# Patient Record
Sex: MALE | Race: BLACK OR AFRICAN AMERICAN | Employment: OTHER | URBAN - METROPOLITAN AREA
[De-identification: names, ages, dates, MRNs, and addresses within clinical notes are randomized per-mention and may not be internally consistent; named-entity substitution may affect disease eponyms.]

---

## 2017-08-10 ENCOUNTER — HOSPITAL ENCOUNTER (OUTPATIENT)
Age: 72
Discharge: HOME | End: 2017-08-10
Payer: MEDICARE

## 2017-08-10 ENCOUNTER — HOSPITAL ENCOUNTER (OUTPATIENT)
Dept: ULTRASOUND IMAGING | Age: 72
Discharge: HOME | End: 2017-08-10
Payer: MEDICARE

## 2017-08-10 DIAGNOSIS — N28.1 CYST OF KIDNEY, ACQUIRED: ICD-10-CM

## 2017-08-10 PROCEDURE — 76770 US EXAM ABDO BACK WALL COMP: CPT

## 2017-08-10 PROCEDURE — 84153 ASSAY OF PSA TOTAL: CPT

## 2017-08-10 PROCEDURE — 36415 COLL VENOUS BLD VENIPUNCTURE: CPT

## 2017-08-11 LAB — PROSTATE SPECIFIC ANTIGEN: 1.13 UG/L

## 2023-01-24 ENCOUNTER — HOSPITAL ENCOUNTER (OUTPATIENT)
Dept: PREADMISSION TESTING | Age: 78
Discharge: HOME | End: 2023-01-28
Payer: MEDICARE

## 2023-01-24 VITALS
HEART RATE: 85 BPM | WEIGHT: 251.32 LBS | DIASTOLIC BLOOD PRESSURE: 70 MMHG | OXYGEN SATURATION: 97 % | SYSTOLIC BLOOD PRESSURE: 148 MMHG | RESPIRATION RATE: 16 BRPM | TEMPERATURE: 98.2 F | BODY MASS INDEX: 33.31 KG/M2 | HEIGHT: 73 IN

## 2023-01-24 LAB
ABSOLUTE EOS #: 0.09 K/UL (ref 0–0.44)
ABSOLUTE IMMATURE GRANULOCYTE: 0.01 K/UL (ref 0–0.3)
ABSOLUTE LYMPH #: 2.03 K/UL (ref 1.1–3.7)
ABSOLUTE MONO #: 0.65 K/UL (ref 0.1–1.2)
ANION GAP SERPL CALCULATED.3IONS-SCNC: 11 MMOL/L (ref 9–17)
BASOPHILS # BLD: 1 % (ref 0–2)
BASOPHILS ABSOLUTE: 0.05 K/UL (ref 0–0.2)
BILIRUBIN URINE: ABNORMAL
BUN BLDV-MCNC: 11 MG/DL (ref 8–23)
BUN/CREAT BLD: 11 (ref 9–20)
CALCIUM SERPL-MCNC: 9.5 MG/DL (ref 8.6–10.4)
CHLORIDE BLD-SCNC: 106 MMOL/L (ref 98–107)
CO2: 21 MMOL/L (ref 20–31)
COLOR: ABNORMAL
CREAT SERPL-MCNC: 0.96 MG/DL (ref 0.7–1.2)
EOSINOPHILS RELATIVE PERCENT: 1 % (ref 1–4)
EPITHELIAL CELLS UA: ABNORMAL /HPF (ref 0–5)
GFR SERPL CREATININE-BSD FRML MDRD: >60 ML/MIN/1.73M2
GLUCOSE BLD-MCNC: 129 MG/DL (ref 70–99)
GLUCOSE URINE: ABNORMAL
HCT VFR BLD CALC: 48.7 % (ref 40.7–50.3)
HEMOGLOBIN: 15.8 G/DL (ref 13–17)
IMMATURE GRANULOCYTES: 0 %
KETONES, URINE: NEGATIVE
LEUKOCYTE ESTERASE, URINE: NEGATIVE
LYMPHOCYTES # BLD: 32 % (ref 24–43)
MCH RBC QN AUTO: 30.6 PG (ref 25.2–33.5)
MCHC RBC AUTO-ENTMCNC: 32.4 G/DL (ref 28.4–34.8)
MCV RBC AUTO: 94.2 FL (ref 82.6–102.9)
MONOCYTES # BLD: 10 % (ref 3–12)
MUCUS: ABNORMAL
NITRITE, URINE: NEGATIVE
NRBC AUTOMATED: 0 PER 100 WBC
PDW BLD-RTO: 13.1 % (ref 11.8–14.4)
PH UA: 5.5 (ref 5–8)
PLATELET # BLD: 218 K/UL (ref 138–453)
PMV BLD AUTO: 10.8 FL (ref 8.1–13.5)
POTASSIUM SERPL-SCNC: 4.6 MMOL/L (ref 3.7–5.3)
PROTEIN UA: ABNORMAL
RBC # BLD: 5.17 M/UL (ref 4.21–5.77)
RBC UA: ABNORMAL /HPF (ref 0–2)
REASON FOR REJECTION: NORMAL
SEG NEUTROPHILS: 56 % (ref 36–65)
SEGMENTED NEUTROPHILS ABSOLUTE COUNT: 3.56 K/UL (ref 1.5–8.1)
SODIUM BLD-SCNC: 138 MMOL/L (ref 135–144)
SPECIFIC GRAVITY UA: 1.05 (ref 1–1.03)
TURBIDITY: ABNORMAL
URINE HGB: NEGATIVE
UROBILINOGEN, URINE: NORMAL
WBC # BLD: 6.4 K/UL (ref 3.5–11.3)
WBC UA: ABNORMAL /HPF (ref 0–5)
ZZ NTE CLEAN UP: ORDERED TEST: NORMAL
ZZ NTE WITH NAME CLEAN UP: SPECIMEN SOURCE: NORMAL

## 2023-01-24 PROCEDURE — 81001 URINALYSIS AUTO W/SCOPE: CPT

## 2023-01-24 PROCEDURE — 93005 ELECTROCARDIOGRAM TRACING: CPT | Performed by: ORTHOPAEDIC SURGERY

## 2023-01-24 PROCEDURE — 80048 BASIC METABOLIC PNL TOTAL CA: CPT

## 2023-01-24 PROCEDURE — 36415 COLL VENOUS BLD VENIPUNCTURE: CPT

## 2023-01-24 PROCEDURE — 87086 URINE CULTURE/COLONY COUNT: CPT

## 2023-01-24 PROCEDURE — 85025 COMPLETE CBC W/AUTO DIFF WBC: CPT

## 2023-01-24 RX ORDER — LISINOPRIL 20 MG/1
TABLET ORAL
COMMUNITY
Start: 2022-11-30

## 2023-01-24 ASSESSMENT — PAIN DESCRIPTION - LOCATION: LOCATION: BACK

## 2023-01-24 ASSESSMENT — PAIN DESCRIPTION - FREQUENCY: FREQUENCY: CONTINUOUS

## 2023-01-24 ASSESSMENT — PAIN DESCRIPTION - ONSET: ONSET: ON-GOING

## 2023-01-24 ASSESSMENT — PAIN SCALES - GENERAL: PAINLEVEL_OUTOF10: 7

## 2023-01-24 ASSESSMENT — PAIN DESCRIPTION - DESCRIPTORS: DESCRIPTORS: THROBBING

## 2023-01-24 NOTE — H&P
History and Physical Service   [facility]    HISTORY AND PHYSICAL EXAMINATION            Date of Evaluation: 1/24/2023  Patient name:  Morgan Barker  MRN:   [id number]  YOB: 1945  PCP:    Marni Mcdonald MD    History Obtained From:     Patient, medical records    History of Present Illness: This is Morgan Barker a 65 y.o. male who presents for a pre-admission testing appointment for an upcoming L 3-5 [address] by Nyla Rivera MD scheduled on 02/15/2023 at 0730 due to Spinal stenosis of lumbar region, unspecified whether neurogenic claudication present [M48.061]. The patient's chief complaint is 5-6/10 lower back pain aching pain that radiates to bilateral lower extremities which has progressively worsened over the past year. Back pain is aggravated by standing after prolonged sitting or laying and is minimally relieved with rest, stretching. Prior treatment includes nerve ablation, steroid injections with no relief. Denies recent falls and injuries. Sleep apnea questionnaire  1) Do you snore loudly? Yes  2) Do you often feel tired, fatigued, or sleepy? No  3) Has anyone observed you stop breathing or choking/gasping during your sleep? No  4) Do you have hypertension? Yes  5) BMI >35 kg/m2? No  6) Age > 50? Yes  7) Pt is a male? Yes    Functional Capacity:  1) Pt is able to walk 2 city blocks on level ground, but not without back pain. 2) Pt is able to climb 2 flights of stairs, but not without back pain. 3) Pt is not able to walk up a hill for 1-2 city blocks without back pain. Uses quad cane for stability.      Past Medical History:     Past Medical History:   Diagnosis Date    Arthritis     BPH (benign prostatic hyperplasia)     Cancer (HCC)     prostate    Cystic kidney disease     DDD (degenerative disc disease), lumbar     Hypertension     Renal mass     Spondylolisthesis of lumbar region         Past Surgical History:     Past Surgical History:   Procedure Laterality Date    COLONOSCOPY      CYSTOSCOPY  2012    TURP  2006        Medications Prior to Admission:     Prior to Admission medications    Medication Sig Start Date End Date Taking? Authorizing Provider   lisinopril (PRINIVIL;ZESTRIL) 20 MG tablet take 1 tablet by mouth once daily 11/30/22   Historical Provider, MD        Allergies:     Patient has no known allergies. Social History:     Tobacco:    reports that he has been smoking cigarettes. He started smoking about 63 years ago. He has been smoking an average of .5 packs per day. He has never used smokeless tobacco.  Alcohol:      reports current alcohol use. Drug Use:  has no history on file for drug use. Family History:     History reviewed. No pertinent family history. Review of Systems:     Positive and Negative as described in HPI. CONSTITUTIONAL: Negative for fevers, chills, sweats, fatigue, and weight loss. HEENT: Glasses. Full dentures. Negative for hearing changes, rhinorrhea, and throat pain. RESPIRATORY: Negative for shortness of breath, cough, congestion, and wheezing. CARDIOVASCULAR: Negative for chest pain, blood clot, irregular heartbeat, and palpitations. GASTROINTESTINAL: Hemorrhoids. Negative for reflux, nausea, vomiting, diarrhea, constipation, change in bowel habits, and abdominal pain. GENITOURINARY: Hx TURP for BPH- no current urinary issues. Follows with urologist yearly. Negative for difficulty of urination, burning with urination, and frequency. INTEGUMENT: Negative for rash, skin lesions, and easy bruising. Instructed pt to call Dr. Sim Blanchard as soon as possible if a rash or wound develops prior to surgery. Pt voiced understanding. HEMATOLOGIC/LYMPHATIC: Negative for swelling/edema. ALLERGIC/IMMUNOLOGIC: Negative for urticaria and itching. ENDOCRINE: Negative for increase in thirst, increase in urination, and heat or cold intolerance. MUSCULOSKELETAL: See HPI.    NEUROLOGICAL: Neuropathy hands and feet. Negative for headaches, dizziness, lightheadedness  BEHAVIOR/PSYCH: Negative for depression and anxiety. Physical Exam:   BP (!) 148/70   Pulse 85   Temp 98.2 °F (36.8 °C) (Oral)   Resp 16   Ht 6' 1\" (1.854 m)   Wt 251 lb 5.2 oz (114 kg)   SpO2 97%   BMI 33.16 kg/m²   No LMP for male patient. No obstetric history on file. No results for input(s): POCGLU in the last 72 hours. General Appearance:  Alert, well appearing, and in no acute distress. Mental status:  Oriented to person, place, and time. Head:  Normocephalic and atraumatic. Eye: Glasses. No icterus, redness, pupils equal and reactive, extraocular eye movements intact, and conjunctiva clear. Ear:  Hearing grossly intact. Nose:  No drainage noted. Mouth: Full dentures. Mucous membranes moist.  Neck:  Supple and no carotid bruits noted. Lungs:  Bilateral equal air entry, clear to auscultation, no wheezing, rales or rhonchi, and normal effort. Cardiovascular:  Normal rate, regular rhythm, no murmur, gallop, or rub. Abdomen: Obese. Soft, non-tender, non-distended, and active bowel sounds. Neurologic: Antalgic gait with quad cane. Normal speech and cranial nerves II through XII grossly intact. Strength 5/5 bilaterally. Skin: Dry skin all extremities. No gross lesions, rashes, bruising, or bleeding on exposed skin area. Extremities: Chronic calf tenderness due to radiating back pain. Posterior tibial pulses 2+ bilaterally. No pedal edema. Psych:  Normal affect.      Investigations:      Laboratory Testing:  Recent Results (from the past 24 hour(s))   EKG 12 Lead    Collection Time: 01/24/23 11:26 AM   Result Value Ref Range    Ventricular Rate 69 BPM    Atrial Rate 69 BPM    P-R Interval 146 ms    QRS Duration 90 ms    Q-T Interval 354 ms    QTc Calculation (Bazett) 379 ms    P Axis 58 degrees    R Axis -7 degrees    T Axis 45 degrees   Basic Metabolic Panel    Collection Time: 01/24/23 11:31 AM   Result Value Ref Range    Glucose 129 (H) 70 - 99 mg/dL    BUN 11 8 - 23 mg/dL    Creatinine 0.96 0.70 - 1.20 mg/dL    Est, Glom Filt Rate >60 >60 mL/min/1.73m2    Bun/Cre Ratio 11 9 - 20    Calcium 9.5 8.6 - 10.4 mg/dL    Sodium 138 135 - 144 mmol/L    Potassium 4.6 3.7 - 5.3 mmol/L    Chloride 106 98 - 107 mmol/L    CO2 21 20 - 31 mmol/L    Anion Gap 11 9 - 17 mmol/L   CBC with Auto Differential    Collection Time: 23 11:31 AM   Result Value Ref Range    WBC 6.4 3.5 - 11.3 k/uL    RBC 5.17 4.21 - 5.77 m/uL    Hemoglobin 15.8 13.0 - 17.0 g/dL    Hematocrit 48.7 40.7 - 50.3 %    MCV 94.2 82.6 - 102.9 fL    MCH 30.6 25.2 - 33.5 pg    MCHC 32.4 28.4 - 34.8 g/dL    RDW 13.1 11.8 - 14.4 %    Platelets 461 432 - 429 k/uL    MPV 10.8 8.1 - 13.5 fL    NRBC Automated 0.0 0.0 per 100 WBC    Seg Neutrophils 56 36 - 65 %    Lymphocytes 32 24 - 43 %    Monocytes 10 3 - 12 %    Eosinophils % 1 1 - 4 %    Basophils 1 0 - 2 %    Immature Granulocytes 0 0 %    Segs Absolute 3.56 1.50 - 8.10 k/uL    Absolute Lymph # 2.03 1.10 - 3.70 k/uL    Absolute Mono # 0.65 0.10 - 1.20 k/uL    Absolute Eos # 0.09 0.00 - 0.44 k/uL    Basophils Absolute 0.05 0.00 - 0.20 k/uL    Absolute Immature Granulocyte 0.01 0.00 - 0.30 k/uL   SPECIMEN REJECTION    Collection Time: 23 11:31 AM   Result Value Ref Range    Specimen Source . BLOOD     Ordered Test PT,PTT     Reason for Rejection Unable to perform testing: Specimen hemolyzed. Recent Labs     23  1131   HGB 15.8   HCT 48.7   WBC 6.4   MCV 94.2      K 4.6      CO2 21   BUN 11   CREATININE 0.96   GLUCOSE 129*       No results for input(s): COVID19 in the last 720 hours. *Please note that labs listed above are the most recent lab values available in EPIC at the time of the visit and additional labs may have been drawn or resulted since that time. Imaging/Diagnostics:      No results found. EK2023. See Epic. Diagnosis:      1.  Spinal stenosis of lumbar region, unspecified whether neurogenic claudication present [M48.061]. Plans:     1.  L 3-5 LUMBAR LAMINECTOMY POSTERIOR  - SAMEERA Cotto - CNP  1/24/2023  12:05 PM

## 2023-01-24 NOTE — PRE-PROCEDURE INSTRUCTIONS
[facility] at 06:00 AM    Once you enter the hospital lobby, take the elevators to the second floor. Check-In is at the surgery registration desk. Continue to take your home medications as you normally do up to and including the night before surgery with the exception of any blood thinning medications. Please stop any blood thinning medications as directed by your surgeon or prescribing physician. Failure to stop certain medications may interfere with your scheduled surgery. These may include:  Aspirin, Warfarin (Coumadin), Clopidogrel (Plavix), Ibuprofen (Motrin, Advil), Naproxen (Aleve), Meloxicam (Mobic), Celecoxib (Celebrex), Eliquis, Pradaxa, Xarelto, Effient, Fish Oil, Herbal supplements. Please take the following medication(s) the day of surgery with a small sip of water:  none      PREPARING FOR YOUR SURGERY:     Before surgery, you can play an important role in your own health. Because skin is not sterile, we need to be sure that your skin is as free of germs as possible before surgery by carefully washing before surgery. Preparing or prepping skin before surgery can reduce the risk of a surgical site infection.   Do not shave the area of your body where your surgery will be performed unless you received specific permission from your physician. You will need to shower at home the night before surgery and the morning of surgery with a special soap called chlorhexidine gluconate (CHG*). *Not to be used by people allergic to Chlorhexidine Gluconate (CHG). Following these instructions will help you be sure that your skin is clean before surgery. Instructions on cleaning your skin before surgery: The night before your surgery: You will need to shower with warm water (not hot) and the CHG soap. Use a clean wash cloth and a clean towel. Have clean clothes available to put on after the shower.       First wash your hair with regular shampoo. Rinse your hair and body thoroughly to remove the shampoo. Wash your face and genital area (private parts) with your regular soap or water only. Thoroughly rinse your body with warm water from the neck down. Turn water off to prevent rinsing the soap off too soon. With a clean wet washcloth and half of the CHG soap in the bottle, lather your entire body from the neck down. Do not use CHG soap near your eyes or ears to avoid injury to those areas. Wash thoroughly, paying special attention to the area where your surgery will be performed. Wash your body gently for five (5) minutes. Avoid scrubbing your skin too hard. Turn the water back on and rinse your body thoroughly. Pat yourself dry with a clean, soft towel. Do not apply lotion, cream or powder. Dress with clean freshly washed clothes. The morning of surgery:    Repeat shower following steps above - using remaining half of CHG soap in bottle. Patient Instructions: If you are having any type of anesthesia you are to have nothing to eat or drink after midnight the night before your surgery. This includes gum, hard candy, mints, water or smoking or chewing tobacco.  The only exception to this is a small sip of water to take with any morning dose of heart, blood pressure, or seizure medications. No alcoholic beverages for 24 hours prior to surgery. Brush your teeth but do not swallow water. Bring your eyeglasses and case with you. No contacts are to be worn the day of surgery. You also may bring your hearing aids. Most surgical procedures involving anesthesia will require that you remove your dentures prior to surgery. Do not wear any jewelry or body piercings day of surgery. Also, NO lotion, perfume or deodorant to be used the day of surgery.   No nail polish on the operative extremity (arm/leg surgeries)    If you are staying overnight with us, please bring a small bag of necessary personal items. Please wear loose, comfortable clothing. If you are potentially going to have a cast or brace bring clothing that will fit over them. In case of illness - If you have cold or flu like symptoms (high fever, runny nose, sore throat, cough, etc.) rash, nausea, vomiting, loose stools, and/or recent contact with someone who has a contagious disease (chicken pox, measles, etc.) Please call your doctor before coming to the hospital.         Day of Surgery/Procedure:    As a patient at [facility] you can expect quality medical and nursing care that is centered on your individual needs. Our goal is to make your surgical experience as comfortable as possible    . Transportation After Your Surgery/Procedure: You will need a friend or family member to drive you home after your procedure. Your  must be 25years of age or older and able to sign off on your discharge instructions. A taxi cab or any other form of public transportation is not acceptable. Your friend or family member must stay at the hospital throughout your procedure. Someone must remain with you for the first 24 hours after your surgery if you receive anesthesia or medication. If you do not have someone to stay with you, your procedure may be cancelled.       If you have any other questions regarding your procedure or the day of surgery, please call [phone]      _________________________  ____________________________  Signature (Patient)              Signature (Provider) & date

## 2023-01-25 LAB
CULTURE: NORMAL
EKG ATRIAL RATE: 69 BPM
EKG P AXIS: 58 DEGREES
EKG P-R INTERVAL: 146 MS
EKG Q-T INTERVAL: 354 MS
EKG QRS DURATION: 90 MS
EKG QTC CALCULATION (BAZETT): 379 MS
EKG R AXIS: -7 DEGREES
EKG T AXIS: 45 DEGREES
EKG VENTRICULAR RATE: 69 BPM
SPECIMEN DESCRIPTION: NORMAL

## 2023-02-15 ENCOUNTER — APPOINTMENT (OUTPATIENT)
Dept: GENERAL RADIOLOGY | Age: 78
End: 2023-02-15
Attending: ORTHOPAEDIC SURGERY
Payer: MEDICARE

## 2023-02-15 ENCOUNTER — ANESTHESIA (OUTPATIENT)
Dept: OPERATING ROOM | Age: 78
End: 2023-02-15
Payer: MEDICARE

## 2023-02-15 ENCOUNTER — ANESTHESIA EVENT (OUTPATIENT)
Dept: OPERATING ROOM | Age: 78
End: 2023-02-15
Payer: MEDICARE

## 2023-02-15 ENCOUNTER — HOSPITAL ENCOUNTER (OUTPATIENT)
Age: 78
Discharge: HOME | End: 2023-02-16
Attending: ORTHOPAEDIC SURGERY | Admitting: ORTHOPAEDIC SURGERY
Payer: MEDICARE

## 2023-02-15 DIAGNOSIS — M48.062 LUMBAR STENOSIS WITH NEUROGENIC CLAUDICATION: Primary | Chronic | ICD-10-CM

## 2023-02-15 PROBLEM — R73.9 HYPERGLYCEMIA: Status: ACTIVE | Noted: 2023-02-15

## 2023-02-15 PROBLEM — Z72.0 TOBACCO ABUSE: Status: ACTIVE | Noted: 2023-02-15

## 2023-02-15 PROBLEM — I10 PRIMARY HYPERTENSION: Status: ACTIVE | Noted: 2023-02-15

## 2023-02-15 PROBLEM — E66.09 CLASS 1 OBESITY DUE TO EXCESS CALORIES WITH SERIOUS COMORBIDITY IN ADULT: Status: ACTIVE | Noted: 2023-02-15

## 2023-02-15 LAB
ABSOLUTE EOS #: <0.03 K/UL (ref 0–0.44)
ABSOLUTE IMMATURE GRANULOCYTE: 0.03 K/UL (ref 0–0.3)
ABSOLUTE LYMPH #: 0.83 K/UL (ref 1.1–3.7)
ABSOLUTE MONO #: 0.38 K/UL (ref 0.1–1.2)
ANION GAP SERPL CALCULATED.3IONS-SCNC: 11 MMOL/L (ref 9–17)
BASOPHILS # BLD: 0 % (ref 0–2)
BASOPHILS ABSOLUTE: <0.03 K/UL (ref 0–0.2)
BUN SERPL-MCNC: 13 MG/DL (ref 8–23)
BUN/CREAT BLD: 11 (ref 9–20)
CALCIUM SERPL-MCNC: 9.1 MG/DL (ref 8.6–10.4)
CHLORIDE SERPL-SCNC: 99 MMOL/L (ref 98–107)
CO2 SERPL-SCNC: 23 MMOL/L (ref 20–31)
CREAT SERPL-MCNC: 1.2 MG/DL (ref 0.7–1.2)
EOSINOPHILS RELATIVE PERCENT: 0 % (ref 1–4)
GFR SERPL CREATININE-BSD FRML MDRD: >60 ML/MIN/1.73M2
GLUCOSE BLD-MCNC: 149 MG/DL (ref 75–110)
GLUCOSE SERPL-MCNC: 196 MG/DL (ref 70–99)
HCT VFR BLD AUTO: 42.3 % (ref 40.7–50.3)
HGB BLD-MCNC: 14 G/DL (ref 13–17)
IMMATURE GRANULOCYTES: 0 %
INR PPP: 0.9
LYMPHOCYTES # BLD: 8 % (ref 24–43)
MCH RBC QN AUTO: 30.8 PG (ref 25.2–33.5)
MCHC RBC AUTO-ENTMCNC: 33.1 G/DL (ref 28.4–34.8)
MCV RBC AUTO: 93.2 FL (ref 82.6–102.9)
MONOCYTES # BLD: 4 % (ref 3–12)
NRBC AUTOMATED: 0 PER 100 WBC
PARTIAL THROMBOPLASTIN TIME: 28.3 SEC (ref 23.9–33.8)
PDW BLD-RTO: 13.1 % (ref 11.8–14.4)
PLATELET # BLD AUTO: 212 K/UL (ref 138–453)
PMV BLD AUTO: 10.1 FL (ref 8.1–13.5)
POTASSIUM SERPL-SCNC: 4.7 MMOL/L (ref 3.7–5.3)
PROTHROMBIN TIME: 12.5 SEC (ref 11.5–14.2)
RBC # BLD: 4.54 M/UL (ref 4.21–5.77)
SEG NEUTROPHILS: 88 % (ref 36–65)
SEGMENTED NEUTROPHILS ABSOLUTE COUNT: 9.1 K/UL (ref 1.5–8.1)
SODIUM SERPL-SCNC: 133 MMOL/L (ref 135–144)
WBC # BLD AUTO: 10.4 K/UL (ref 3.5–11.3)

## 2023-02-15 PROCEDURE — 97116 GAIT TRAINING THERAPY: CPT

## 2023-02-15 PROCEDURE — 97162 PT EVAL MOD COMPLEX 30 MIN: CPT

## 2023-02-15 PROCEDURE — 83036 HEMOGLOBIN GLYCOSYLATED A1C: CPT

## 2023-02-15 PROCEDURE — 2720000010 HC SURG SUPPLY STERILE: Performed by: ORTHOPAEDIC SURGERY

## 2023-02-15 PROCEDURE — 3209999900 FLUORO FOR SURGICAL PROCEDURES

## 2023-02-15 PROCEDURE — 2580000003 HC RX 258: Performed by: ORTHOPAEDIC SURGERY

## 2023-02-15 PROCEDURE — 7100000000 HC PACU RECOVERY - FIRST 15 MIN: Performed by: ORTHOPAEDIC SURGERY

## 2023-02-15 PROCEDURE — 3700000001 HC ADD 15 MINUTES (ANESTHESIA): Performed by: ORTHOPAEDIC SURGERY

## 2023-02-15 PROCEDURE — 6370000000 HC RX 637 (ALT 250 FOR IP): Performed by: ORTHOPAEDIC SURGERY

## 2023-02-15 PROCEDURE — 2500000003 HC RX 250 WO HCPCS: Performed by: NURSE ANESTHETIST, CERTIFIED REGISTERED

## 2023-02-15 PROCEDURE — 85730 THROMBOPLASTIN TIME PARTIAL: CPT

## 2023-02-15 PROCEDURE — 6360000002 HC RX W HCPCS: Performed by: ORTHOPAEDIC SURGERY

## 2023-02-15 PROCEDURE — 85610 PROTHROMBIN TIME: CPT

## 2023-02-15 PROCEDURE — 2709999900 HC NON-CHARGEABLE SUPPLY: Performed by: ORTHOPAEDIC SURGERY

## 2023-02-15 PROCEDURE — 2580000003 HC RX 258: Performed by: ANESTHESIOLOGY

## 2023-02-15 PROCEDURE — 36415 COLL VENOUS BLD VENIPUNCTURE: CPT

## 2023-02-15 PROCEDURE — 2500000003 HC RX 250 WO HCPCS: Performed by: ORTHOPAEDIC SURGERY

## 2023-02-15 PROCEDURE — 85025 COMPLETE CBC W/AUTO DIFF WBC: CPT

## 2023-02-15 PROCEDURE — 6360000002 HC RX W HCPCS: Performed by: NURSE ANESTHETIST, CERTIFIED REGISTERED

## 2023-02-15 PROCEDURE — 97530 THERAPEUTIC ACTIVITIES: CPT

## 2023-02-15 PROCEDURE — 82947 ASSAY GLUCOSE BLOOD QUANT: CPT

## 2023-02-15 PROCEDURE — 3700000000 HC ANESTHESIA ATTENDED CARE: Performed by: ORTHOPAEDIC SURGERY

## 2023-02-15 PROCEDURE — A4217 STERILE WATER/SALINE, 500 ML: HCPCS | Performed by: ORTHOPAEDIC SURGERY

## 2023-02-15 PROCEDURE — 80048 BASIC METABOLIC PNL TOTAL CA: CPT

## 2023-02-15 PROCEDURE — 2580000003 HC RX 258: Performed by: NURSE ANESTHETIST, CERTIFIED REGISTERED

## 2023-02-15 PROCEDURE — 7100000001 HC PACU RECOVERY - ADDTL 15 MIN: Performed by: ORTHOPAEDIC SURGERY

## 2023-02-15 PROCEDURE — 3600000002 HC SURGERY LEVEL 2 BASE: Performed by: ORTHOPAEDIC SURGERY

## 2023-02-15 PROCEDURE — 99221 1ST HOSP IP/OBS SF/LOW 40: CPT | Performed by: STUDENT IN AN ORGANIZED HEALTH CARE EDUCATION/TRAINING PROGRAM

## 2023-02-15 PROCEDURE — 3600000012 HC SURGERY LEVEL 2 ADDTL 15MIN: Performed by: ORTHOPAEDIC SURGERY

## 2023-02-15 RX ORDER — FENTANYL CITRATE 50 UG/ML
25 INJECTION, SOLUTION INTRAMUSCULAR; INTRAVENOUS EVERY 5 MIN PRN
Status: DISCONTINUED | OUTPATIENT
Start: 2023-02-15 | End: 2023-02-15 | Stop reason: HOSPADM

## 2023-02-15 RX ORDER — SODIUM CHLORIDE 9 MG/ML
INJECTION, SOLUTION INTRAVENOUS PRN
Status: DISCONTINUED | OUTPATIENT
Start: 2023-02-15 | End: 2023-02-15 | Stop reason: HOSPADM

## 2023-02-15 RX ORDER — SENNA AND DOCUSATE SODIUM 50; 8.6 MG/1; MG/1
1 TABLET, FILM COATED ORAL 2 TIMES DAILY
Status: DISCONTINUED | OUTPATIENT
Start: 2023-02-15 | End: 2023-02-16 | Stop reason: HOSPADM

## 2023-02-15 RX ORDER — HYDROMORPHONE HYDROCHLORIDE 1 MG/ML
0.5 INJECTION, SOLUTION INTRAMUSCULAR; INTRAVENOUS; SUBCUTANEOUS EVERY 5 MIN PRN
Status: DISCONTINUED | OUTPATIENT
Start: 2023-02-15 | End: 2023-02-15 | Stop reason: HOSPADM

## 2023-02-15 RX ORDER — SODIUM CHLORIDE 9 MG/ML
INJECTION, SOLUTION INTRAVENOUS PRN
Status: DISCONTINUED | OUTPATIENT
Start: 2023-02-15 | End: 2023-02-16 | Stop reason: HOSPADM

## 2023-02-15 RX ORDER — SODIUM CHLORIDE 0.9 % (FLUSH) 0.9 %
5-40 SYRINGE (ML) INJECTION PRN
Status: DISCONTINUED | OUTPATIENT
Start: 2023-02-15 | End: 2023-02-15 | Stop reason: HOSPADM

## 2023-02-15 RX ORDER — SODIUM CHLORIDE 0.9 % (FLUSH) 0.9 %
5-40 SYRINGE (ML) INJECTION EVERY 12 HOURS SCHEDULED
Status: DISCONTINUED | OUTPATIENT
Start: 2023-02-15 | End: 2023-02-16 | Stop reason: HOSPADM

## 2023-02-15 RX ORDER — HYDROCODONE BITARTRATE AND ACETAMINOPHEN 5; 325 MG/1; MG/1
2 TABLET ORAL EVERY 4 HOURS PRN
Status: DISCONTINUED | OUTPATIENT
Start: 2023-02-15 | End: 2023-02-16 | Stop reason: HOSPADM

## 2023-02-15 RX ORDER — MORPHINE SULFATE 2 MG/ML
2 INJECTION, SOLUTION INTRAMUSCULAR; INTRAVENOUS
Status: DISCONTINUED | OUTPATIENT
Start: 2023-02-15 | End: 2023-02-16 | Stop reason: HOSPADM

## 2023-02-15 RX ORDER — SODIUM CHLORIDE, SODIUM LACTATE, POTASSIUM CHLORIDE, CALCIUM CHLORIDE 600; 310; 30; 20 MG/100ML; MG/100ML; MG/100ML; MG/100ML
INJECTION, SOLUTION INTRAVENOUS CONTINUOUS PRN
Status: DISCONTINUED | OUTPATIENT
Start: 2023-02-15 | End: 2023-02-15 | Stop reason: SDUPTHER

## 2023-02-15 RX ORDER — BUPIVACAINE HYDROCHLORIDE AND EPINEPHRINE 5; 5 MG/ML; UG/ML
INJECTION, SOLUTION EPIDURAL; INTRACAUDAL; PERINEURAL PRN
Status: DISCONTINUED | OUTPATIENT
Start: 2023-02-15 | End: 2023-02-15 | Stop reason: ALTCHOICE

## 2023-02-15 RX ORDER — MORPHINE SULFATE 4 MG/ML
4 INJECTION, SOLUTION INTRAMUSCULAR; INTRAVENOUS
Status: DISCONTINUED | OUTPATIENT
Start: 2023-02-15 | End: 2023-02-16 | Stop reason: HOSPADM

## 2023-02-15 RX ORDER — DEXTROSE MONOHYDRATE 100 MG/ML
INJECTION, SOLUTION INTRAVENOUS CONTINUOUS PRN
Status: DISCONTINUED | OUTPATIENT
Start: 2023-02-15 | End: 2023-02-16 | Stop reason: HOSPADM

## 2023-02-15 RX ORDER — PHENYLEPHRINE HCL IN 0.9% NACL 1 MG/10 ML
SYRINGE (ML) INTRAVENOUS PRN
Status: DISCONTINUED | OUTPATIENT
Start: 2023-02-15 | End: 2023-02-15 | Stop reason: SDUPTHER

## 2023-02-15 RX ORDER — ROCURONIUM BROMIDE 10 MG/ML
INJECTION, SOLUTION INTRAVENOUS PRN
Status: DISCONTINUED | OUTPATIENT
Start: 2023-02-15 | End: 2023-02-15 | Stop reason: SDUPTHER

## 2023-02-15 RX ORDER — HYDROMORPHONE HCL 110MG/55ML
PATIENT CONTROLLED ANALGESIA SYRINGE INTRAVENOUS PRN
Status: DISCONTINUED | OUTPATIENT
Start: 2023-02-15 | End: 2023-02-15 | Stop reason: SDUPTHER

## 2023-02-15 RX ORDER — PROPOFOL 10 MG/ML
INJECTION, EMULSION INTRAVENOUS PRN
Status: DISCONTINUED | OUTPATIENT
Start: 2023-02-15 | End: 2023-02-15 | Stop reason: SDUPTHER

## 2023-02-15 RX ORDER — DEXAMETHASONE SODIUM PHOSPHATE 10 MG/ML
INJECTION, SOLUTION INTRAMUSCULAR; INTRAVENOUS PRN
Status: DISCONTINUED | OUTPATIENT
Start: 2023-02-15 | End: 2023-02-15 | Stop reason: SDUPTHER

## 2023-02-15 RX ORDER — INSULIN LISPRO 100 [IU]/ML
0-4 INJECTION, SOLUTION INTRAVENOUS; SUBCUTANEOUS NIGHTLY
Status: DISCONTINUED | OUTPATIENT
Start: 2023-02-15 | End: 2023-02-16 | Stop reason: HOSPADM

## 2023-02-15 RX ORDER — INSULIN LISPRO 100 [IU]/ML
0-4 INJECTION, SOLUTION INTRAVENOUS; SUBCUTANEOUS
Status: DISCONTINUED | OUTPATIENT
Start: 2023-02-15 | End: 2023-02-16 | Stop reason: HOSPADM

## 2023-02-15 RX ORDER — SODIUM CHLORIDE 9 MG/ML
INJECTION, SOLUTION INTRAVENOUS CONTINUOUS
Status: DISCONTINUED | OUTPATIENT
Start: 2023-02-15 | End: 2023-02-16 | Stop reason: HOSPADM

## 2023-02-15 RX ORDER — LISINOPRIL 20 MG/1
20 TABLET ORAL DAILY
Status: DISCONTINUED | OUTPATIENT
Start: 2023-02-15 | End: 2023-02-16 | Stop reason: HOSPADM

## 2023-02-15 RX ORDER — ONDANSETRON 2 MG/ML
4 INJECTION INTRAMUSCULAR; INTRAVENOUS EVERY 6 HOURS PRN
Status: DISCONTINUED | OUTPATIENT
Start: 2023-02-15 | End: 2023-02-16 | Stop reason: HOSPADM

## 2023-02-15 RX ORDER — SODIUM CHLORIDE 0.9 % (FLUSH) 0.9 %
5-40 SYRINGE (ML) INJECTION EVERY 12 HOURS SCHEDULED
Status: DISCONTINUED | OUTPATIENT
Start: 2023-02-15 | End: 2023-02-15 | Stop reason: HOSPADM

## 2023-02-15 RX ORDER — HYDROCODONE BITARTRATE AND ACETAMINOPHEN 5; 325 MG/1; MG/1
2 TABLET ORAL EVERY 4 HOURS PRN
Status: DISCONTINUED | OUTPATIENT
Start: 2023-02-15 | End: 2023-02-15

## 2023-02-15 RX ORDER — TIZANIDINE 4 MG/1
4 TABLET ORAL EVERY 8 HOURS PRN
Status: DISCONTINUED | OUTPATIENT
Start: 2023-02-15 | End: 2023-02-16 | Stop reason: HOSPADM

## 2023-02-15 RX ORDER — SODIUM CHLORIDE 9 MG/ML
INJECTION, SOLUTION INTRAVENOUS CONTINUOUS
Status: DISCONTINUED | OUTPATIENT
Start: 2023-02-15 | End: 2023-02-15

## 2023-02-15 RX ORDER — ONDANSETRON 2 MG/ML
INJECTION INTRAMUSCULAR; INTRAVENOUS PRN
Status: DISCONTINUED | OUTPATIENT
Start: 2023-02-15 | End: 2023-02-15 | Stop reason: SDUPTHER

## 2023-02-15 RX ORDER — PROMETHAZINE HYDROCHLORIDE 12.5 MG/1
12.5 TABLET ORAL EVERY 6 HOURS PRN
Status: DISCONTINUED | OUTPATIENT
Start: 2023-02-15 | End: 2023-02-16 | Stop reason: HOSPADM

## 2023-02-15 RX ORDER — SODIUM CHLORIDE, SODIUM LACTATE, POTASSIUM CHLORIDE, CALCIUM CHLORIDE 600; 310; 30; 20 MG/100ML; MG/100ML; MG/100ML; MG/100ML
INJECTION, SOLUTION INTRAVENOUS CONTINUOUS
Status: DISCONTINUED | OUTPATIENT
Start: 2023-02-15 | End: 2023-02-15

## 2023-02-15 RX ORDER — ONDANSETRON 2 MG/ML
4 INJECTION INTRAMUSCULAR; INTRAVENOUS
Status: DISCONTINUED | OUTPATIENT
Start: 2023-02-15 | End: 2023-02-15 | Stop reason: HOSPADM

## 2023-02-15 RX ORDER — POLYETHYLENE GLYCOL 3350 17 G/17G
17 POWDER, FOR SOLUTION ORAL DAILY
Status: DISCONTINUED | OUTPATIENT
Start: 2023-02-15 | End: 2023-02-16 | Stop reason: HOSPADM

## 2023-02-15 RX ORDER — HYDROCODONE BITARTRATE AND ACETAMINOPHEN 5; 325 MG/1; MG/1
1 TABLET ORAL EVERY 4 HOURS PRN
Status: DISCONTINUED | OUTPATIENT
Start: 2023-02-15 | End: 2023-02-16 | Stop reason: HOSPADM

## 2023-02-15 RX ORDER — HYDROCODONE BITARTRATE AND ACETAMINOPHEN 5; 325 MG/1; MG/1
1 TABLET ORAL EVERY 4 HOURS PRN
Status: DISCONTINUED | OUTPATIENT
Start: 2023-02-15 | End: 2023-02-15

## 2023-02-15 RX ORDER — SODIUM CHLORIDE 0.9 % (FLUSH) 0.9 %
5-40 SYRINGE (ML) INJECTION PRN
Status: DISCONTINUED | OUTPATIENT
Start: 2023-02-15 | End: 2023-02-16 | Stop reason: HOSPADM

## 2023-02-15 RX ORDER — VANCOMYCIN HYDROCHLORIDE 1 G/20ML
INJECTION, POWDER, LYOPHILIZED, FOR SOLUTION INTRAVENOUS PRN
Status: DISCONTINUED | OUTPATIENT
Start: 2023-02-15 | End: 2023-02-15 | Stop reason: ALTCHOICE

## 2023-02-15 RX ORDER — LIDOCAINE HYDROCHLORIDE 20 MG/ML
INJECTION, SOLUTION EPIDURAL; INFILTRATION; INTRACAUDAL; PERINEURAL PRN
Status: DISCONTINUED | OUTPATIENT
Start: 2023-02-15 | End: 2023-02-15 | Stop reason: SDUPTHER

## 2023-02-15 RX ORDER — FENTANYL CITRATE 50 UG/ML
INJECTION, SOLUTION INTRAMUSCULAR; INTRAVENOUS PRN
Status: DISCONTINUED | OUTPATIENT
Start: 2023-02-15 | End: 2023-02-15 | Stop reason: SDUPTHER

## 2023-02-15 RX ORDER — HYDROXYZINE HYDROCHLORIDE 10 MG/1
10 TABLET, FILM COATED ORAL EVERY 8 HOURS PRN
Status: DISCONTINUED | OUTPATIENT
Start: 2023-02-15 | End: 2023-02-16 | Stop reason: HOSPADM

## 2023-02-15 RX ORDER — LIDOCAINE HYDROCHLORIDE 10 MG/ML
1 INJECTION, SOLUTION EPIDURAL; INFILTRATION; INTRACAUDAL; PERINEURAL
Status: DISCONTINUED | OUTPATIENT
Start: 2023-02-16 | End: 2023-02-15 | Stop reason: HOSPADM

## 2023-02-15 RX ADMIN — Medication 200 MCG: at 08:32

## 2023-02-15 RX ADMIN — SENNOSIDES AND DOCUSATE SODIUM 1 TABLET: 50; 8.6 TABLET ORAL at 16:38

## 2023-02-15 RX ADMIN — ONDANSETRON 4 MG: 2 INJECTION INTRAMUSCULAR; INTRAVENOUS at 10:08

## 2023-02-15 RX ADMIN — DEXAMETHASONE SODIUM PHOSPHATE 10 MG: 10 INJECTION INTRAMUSCULAR; INTRAVENOUS at 07:45

## 2023-02-15 RX ADMIN — PROPOFOL 130 MG: 10 INJECTION, EMULSION INTRAVENOUS at 07:33

## 2023-02-15 RX ADMIN — HYDROCODONE BITARTRATE AND ACETAMINOPHEN 2 TABLET: 5; 325 TABLET ORAL at 16:38

## 2023-02-15 RX ADMIN — SODIUM CHLORIDE, POTASSIUM CHLORIDE, SODIUM LACTATE AND CALCIUM CHLORIDE: 600; 310; 30; 20 INJECTION, SOLUTION INTRAVENOUS at 08:35

## 2023-02-15 RX ADMIN — FENTANYL CITRATE 100 MCG: 50 INJECTION, SOLUTION INTRAMUSCULAR; INTRAVENOUS at 08:08

## 2023-02-15 RX ADMIN — FENTANYL CITRATE 100 MCG: 50 INJECTION, SOLUTION INTRAMUSCULAR; INTRAVENOUS at 07:33

## 2023-02-15 RX ADMIN — Medication 200 MCG: at 08:35

## 2023-02-15 RX ADMIN — FENTANYL CITRATE 50 MCG: 50 INJECTION, SOLUTION INTRAMUSCULAR; INTRAVENOUS at 10:19

## 2023-02-15 RX ADMIN — Medication 2000 MG: at 16:38

## 2023-02-15 RX ADMIN — Medication 2000 MG: at 07:45

## 2023-02-15 RX ADMIN — LIDOCAINE HYDROCHLORIDE 80 MG: 20 INJECTION, SOLUTION EPIDURAL; INFILTRATION; INTRACAUDAL; PERINEURAL at 07:33

## 2023-02-15 RX ADMIN — SODIUM CHLORIDE: 9 INJECTION, SOLUTION INTRAVENOUS at 16:03

## 2023-02-15 RX ADMIN — ROCURONIUM BROMIDE 50 MG: 10 INJECTION, SOLUTION INTRAVENOUS at 07:33

## 2023-02-15 RX ADMIN — SODIUM CHLORIDE, POTASSIUM CHLORIDE, SODIUM LACTATE AND CALCIUM CHLORIDE: 600; 310; 30; 20 INJECTION, SOLUTION INTRAVENOUS at 07:33

## 2023-02-15 RX ADMIN — SODIUM CHLORIDE, POTASSIUM CHLORIDE, SODIUM LACTATE AND CALCIUM CHLORIDE: 600; 310; 30; 20 INJECTION, SOLUTION INTRAVENOUS at 06:31

## 2023-02-15 RX ADMIN — HYDROCODONE BITARTRATE AND ACETAMINOPHEN 2 TABLET: 5; 325 TABLET ORAL at 20:51

## 2023-02-15 RX ADMIN — PHENYLEPHRINE HYDROCHLORIDE 50 MCG/MIN: 10 INJECTION INTRAVENOUS at 08:47

## 2023-02-15 RX ADMIN — SENNOSIDES AND DOCUSATE SODIUM 1 TABLET: 50; 8.6 TABLET ORAL at 20:51

## 2023-02-15 RX ADMIN — HYDROMORPHONE HYDROCHLORIDE 0.5 MG: 2 INJECTION, SOLUTION INTRAMUSCULAR; INTRAVENOUS; SUBCUTANEOUS at 10:23

## 2023-02-15 RX ADMIN — SUGAMMADEX 200 MG: 100 INJECTION, SOLUTION INTRAVENOUS at 10:04

## 2023-02-15 ASSESSMENT — PAIN DESCRIPTION - DESCRIPTORS
DESCRIPTORS: ACHING;DISCOMFORT;SORE
DESCRIPTORS: DISCOMFORT
DESCRIPTORS: THROBBING
DESCRIPTORS: SORE

## 2023-02-15 ASSESSMENT — PAIN DESCRIPTION - PAIN TYPE: TYPE: SURGICAL PAIN

## 2023-02-15 ASSESSMENT — PAIN - FUNCTIONAL ASSESSMENT: PAIN_FUNCTIONAL_ASSESSMENT: 0-10

## 2023-02-15 ASSESSMENT — LIFESTYLE VARIABLES: SMOKING_STATUS: 1

## 2023-02-15 ASSESSMENT — PAIN SCALES - GENERAL
PAINLEVEL_OUTOF10: 4
PAINLEVEL_OUTOF10: 2
PAINLEVEL_OUTOF10: 2
PAINLEVEL_OUTOF10: 6
PAINLEVEL_OUTOF10: 6

## 2023-02-15 ASSESSMENT — PAIN DESCRIPTION - LOCATION
LOCATION: BACK

## 2023-02-15 ASSESSMENT — PAIN DESCRIPTION - ORIENTATION: ORIENTATION: LOWER

## 2023-02-15 NOTE — ANESTHESIA PRE PROCEDURE
Department of Anesthesiology  Preprocedure Note       Name:  Shaina Barnes   Age:  65 y.o.  :  1945                                          MRN:  [id number]         Date:  2/15/2023      Surgeon: Humera Signs):  Juan Cross MD    Procedure: Procedure(s):  L 3-5 LUMBAR LAMINECTOMY POSTERIOR  - GABRIELA    Medications prior to admission:   Prior to Admission medications    Medication Sig Start Date End Date Taking? Authorizing Provider   lisinopril (PRINIVIL;ZESTRIL) 20 MG tablet take 1 tablet by mouth once daily 22   Historical Provider, MD       Current medications:    Current Facility-Administered Medications   Medication Dose Route Frequency Provider Last Rate Last Admin    [START ON 2023] lidocaine PF 1 % injection 1 mL  1 mL IntraDERmal Once PRN Heidi Malcolm MD        0.9 % sodium chloride infusion   IntraVENous Continuous Heidi Malcolm MD        lactated ringers IV soln infusion   IntraVENous Continuous Heidi Malcolm  mL/hr at 02/15/23 0631 New Bag at 02/15/23 0631    ceFAZolin (ANCEF) 2000 mg in 0.9% sodium chloride 50 mL IVPB  2,000 mg IntraVENous Once Juan Cross MD           Allergies:  No Known Allergies    Problem List:  There is no problem list on file for this patient.       Past Medical History:        Diagnosis Date    Arthritis     BPH (benign prostatic hyperplasia)     Cystic kidney disease     DDD (degenerative disc disease), lumbar     Hypertension     Renal mass     Spondylolisthesis of lumbar region        Past Surgical History:        Procedure Laterality Date    COLONOSCOPY      CYSTOSCOPY  2012    TURP  2006       Social History:    Social History     Tobacco Use    Smoking status: Every Day     Packs/day: 0.50     Types: Cigarettes     Start date:     Smokeless tobacco: Never   Substance Use Topics    Alcohol use: Yes     Comment: rarely                                Ready to quit: Not Answered  Counseling given: Not Answered      Vital Signs (Current):   Vitals:    02/15/23 0605 02/15/23 0608   BP:  (!) 157/72   Pulse:  76   Resp:  18   Temp:  97.3 °F (36.3 °C)   TempSrc:  Temporal   SpO2:  96%   Weight: 251 lb (113.9 kg)    Height: 6' 1\" (1.854 m)                                               BP Readings from Last 3 Encounters:   02/15/23 (!) 157/72   01/24/23 (!) 148/70       NPO Status: Time of last liquid consumption: 2100                        Time of last solid consumption: 2100                        Date of last liquid consumption: 02/14/23                        Date of last solid food consumption: 02/14/23    BMI:   Wt Readings from Last 3 Encounters:   02/15/23 251 lb (113.9 kg)   01/24/23 251 lb 5.2 oz (114 kg)     Body mass index is 33.12 kg/m². CBC:   Lab Results   Component Value Date/Time    WBC 6.4 01/24/2023 11:31 AM    RBC 5.17 01/24/2023 11:31 AM    HGB 15.8 01/24/2023 11:31 AM    HCT 48.7 01/24/2023 11:31 AM    MCV 94.2 01/24/2023 11:31 AM    RDW 13.1 01/24/2023 11:31 AM     01/24/2023 11:31 AM       CMP:   Lab Results   Component Value Date/Time     01/24/2023 11:31 AM    K 4.6 01/24/2023 11:31 AM     01/24/2023 11:31 AM    CO2 21 01/24/2023 11:31 AM    BUN 11 01/24/2023 11:31 AM    CREATININE 0.96 01/24/2023 11:31 AM    GFRAA >60 08/17/2015 02:00 PM    LABGLOM >60 01/24/2023 11:31 AM    GLUCOSE 129 01/24/2023 11:31 AM    CALCIUM 9.5 01/24/2023 11:31 AM       POC Tests: No results for input(s): POCGLU, POCNA, POCK, POCCL, POCBUN, POCHEMO, POCHCT in the last 72 hours.     Coags: No results found for: PROTIME, INR, APTT    HCG (If Applicable): No results found for: PREGTESTUR, PREGSERUM, HCG, HCGQUANT     ABGs: No results found for: PHART, PO2ART, OGK6PIM, ZKX6SRH, BEART, E2ACQGOD     Type & Screen (If Applicable):  No results found for: LABABO, LABRH    Drug/Infectious Status (If Applicable):  No results found for: HIV, HEPCAB    COVID-19 Screening (If Applicable): No results found for: COVID19        Anesthesia Evaluation   no history of anesthetic complications:   Airway: Mallampati: II       Mouth opening: > = 3 FB   Dental:    (+) upper dentures      Pulmonary:Negative Pulmonary ROS and normal exam    (+) current smoker                           Cardiovascular:    (+) hypertension:,     (-)  angina                Neuro/Psych:   (+) neuromuscular disease (neuropathy):,             GI/Hepatic/Renal:             Endo/Other:    (+) : arthritis:., .                 Abdominal:             Vascular: Other Findings:           Anesthesia Plan      general     ASA 3       Induction: intravenous. MIPS: Postoperative opioids intended and Prophylactic antiemetics administered. Anesthetic plan and risks discussed with patient.         Attending anesthesiologist reviewed and agrees with Preprocedure content                Olvin Shannon MD   2/15/2023

## 2023-02-15 NOTE — PLAN OF CARE
Problem: Discharge Planning  Goal: Discharge to home or other facility with appropriate resources  Outcome: Progressing  Flowsheets (Taken 2/15/2023 5568)  Discharge to home or other facility with appropriate resources: Identify barriers to discharge with patient and caregiver     Problem: Pain  Goal: Verbalizes/displays adequate comfort level or baseline comfort level  Outcome: Progressing     Problem: Safety - Adult  Goal: Free from fall injury  Outcome: Progressing

## 2023-02-15 NOTE — CARE COORDINATION
Case Management Initial Discharge Plan  Barby Brochure,             Met with:patient or family member patient and son to discuss discharge plans. Information verified: address, contacts, phone number, , insurance Yes  PCP: Greg Jung MD  Date of last visit:     Insurance Provider: [facility] Medicare    Discharge Planning    Living Arrangements:   Ex wife   Support Systems:   family    Home has 1 stories  2 stairs to climb to get into front door, 0 stairs to climb to reach second floor  Location of bedroom/bathroom in home  - main floor    Patient able to perform ADL's:Independent    Current Services (outpatient & in home) none  DME equipment: cane  DME provider: N/A    Pharmacy: [email or web address] [address] [place]    Potential Assistance Needed:   walker    Patient agreeable to home care: No  [place] of choice provided:  n/a    Prior SNF/Rehab Placement and Facility: No  Agreeable to SNF/Rehab: No  [place] of choice provided: n/a   Evaluation: n/a    Expected Discharge date:   2023  Patient expects to be discharged to:   home  Follow Up Appointment: Best Day/ Time:      Transportation provider: ex wife  Transportation arrangements needed for discharge: No    Readmission Risk              Risk of Unplanned Readmission:  0             Does patient have a readmission risk score greater than 14?: No  If yes, follow-up appointment must be made within 7 days of discharge. Goal of Care:       Discharge Plan: Met with patient and son at bedside. Lives with ex wife, independent and drives. S/P lumbar laminectomy. Will need walker ordered.   Post op appointment with Dr. Elier Mujica 2-28 at [address] [facility] [place].          Electronically signed by Garnette Boast, RN on 2/15/23 at 3:24 PM EST

## 2023-02-15 NOTE — H&P
Interval H&P Note    Pt Name: Jan Quiñonez  MRN: [id number]  YOB: 1945  Date of evaluation: 2/15/2023      [x] I have reviewed in Epic the H&P by Otha Duverney, CNP dated 01/24/2023, attached below for an Interval History and Physical note. [x] I have examined  Jan Quiñonez  There are no changes to the patient who is scheduled for L 3-5 LUMBAR LAMINECTOMY POSTERIOR  - GABRIELA by Odell Chapa MD for Spinal stenosis of lumbar region, unspecified whether neurogenic claudication present [M48.061]. The patient denies new health changes, fever, chills, wheezing, cough, increased SOB, chest pain, open sores or wounds. Denies hx of diabetes. Denies any blood thinning medications. Received medical clearance \"cleared for surgery\" by Dr. Ashlie Hu on 01/26/2023. Clearance in chart. Vital signs: BP (!) 157/72   Pulse 76   Temp 97.3 °F (36.3 °C) (Temporal)   Resp 18   Ht 6' 1\" (1.854 m)   Wt 251 lb (113.9 kg)   SpO2 96%   BMI 33.12 kg/m²     Allergies:  Patient has no known allergies. Medications:    Prior to Admission medications    Medication Sig Start Date End Date Taking? Authorizing Provider   lisinopril (PRINIVIL;ZESTRIL) 20 MG tablet take 1 tablet by mouth once daily 11/30/22   Historical Provider, MD         This is a 65 y.o. male who is pleasant, cooperative, alert and oriented x3, in no acute distress. Heart: Heart sounds are normal.  HR 76 regular rate and rhythm without murmur, gallop or rub. Lungs: Normal respiratory effort with equal expansion, good air exchange, unlabored and clear to auscultation without wheezes or rales bilaterally   Abdomen: Obese. soft, nontender, nondistended with bowel sounds active. Extremities: pedal pulses palpable with no pedal edema or calf tenderness bilaterally. Dorsiflexion and plantar flexion 4/5 bilaterally.      Labs:  Recent Labs     01/24/23  1131   HGB 15.8   HCT 48.7   WBC 6.4   MCV 94.2         K 4.6      CO2 21   BUN 11   CREATININE 0.96   GLUCOSE 129*       No results for input(s): COVID19 in the last 720 hours. SAMEERA Tenorio CNP  Electronically signed 2/15/2023 at 6:41 AM       Palmira Kenia, APRN - CNP   Nurse Practitioner   General Surgery   H&P       Signed   Date of Service:  1/24/2023 11:00 AM          Related encounter: Pre-Admission Testing Visit 30 min from 1/24/2023 in [facility] PRE-ADMIT TESTING     History and Physical Service   [address]     HISTORY AND PHYSICAL EXAMINATION            Date of Evaluation:     1/24/2023  Patient name:              Mel Smallwood  MRN:                           [id number]  YOB: 1945  PCP:                            Miah Kilgroe MD     History Obtained From:      Patient, medical records     History of Present Illness: This is Mel Smallwood a 65 y.o. male who presents for a pre-admission testing appointment for an upcoming L 3-5 [address] by Manny Coker MD scheduled on 02/15/2023 at 0730 due to Spinal stenosis of lumbar region, unspecified whether neurogenic claudication present [M48.061]. The patient's chief complaint is 5-6/10 lower back pain aching pain that radiates to bilateral lower extremities which has progressively worsened over the past year. Back pain is aggravated by standing after prolonged sitting or laying and is minimally relieved with rest, stretching. Prior treatment includes nerve ablation, steroid injections with no relief. Denies recent falls and injuries. Sleep apnea questionnaire  1) Do you snore loudly? Yes  2) Do you often feel tired, fatigued, or sleepy? No  3) Has anyone observed you stop breathing or choking/gasping during your sleep? No  4) Do you have hypertension? Yes  5) BMI >35 kg/m2? No  6) Age > 50? Yes  7) Pt is a male? Yes     Functional Capacity:  1) Pt is able to walk 2 city blocks on level ground, but not without back pain.   2) Pt is able to climb 2 flights of stairs, but not without back pain. 3) Pt is not able to walk up a hill for 1-2 city blocks without back pain. Uses quad cane for stability. Past Medical History:      Past Medical History        Past Medical History:   Diagnosis Date    Arthritis      BPH (benign prostatic hyperplasia)      Cancer ([facility] [address]       prostate    Cystic kidney disease      DDD (degenerative disc disease), lumbar      Hypertension      Renal mass      Spondylolisthesis of lumbar region              Past Surgical History:      Past Surgical History         Past Surgical History:   Procedure Laterality Date    COLONOSCOPY        CYSTOSCOPY   2012    TURP   2006            Medications Prior to Admission:      Home Medications           Prior to Admission medications    Medication Sig Start Date End Date Taking? Authorizing Provider   lisinopril (PRINIVIL;ZESTRIL) 20 MG tablet take 1 tablet by mouth once daily 11/30/22     Historical Provider, MD            Allergies:      Patient has no known allergies. Social History:      Tobacco:    reports that he has been smoking cigarettes. He started smoking about 63 years ago. He has been smoking an average of .5 packs per day. He has never used smokeless tobacco.  Alcohol:      reports current alcohol use. Drug Use:  has no history on file for drug use. Family History:      Family History   History reviewed. No pertinent family history. Review of Systems:      Positive and Negative as described in HPI. CONSTITUTIONAL: Negative for fevers, chills, sweats, fatigue, and weight loss. HEENT: Glasses. Full dentures. Negative for hearing changes, rhinorrhea, and throat pain. RESPIRATORY: Negative for shortness of breath, cough, congestion, and wheezing. CARDIOVASCULAR: Negative for chest pain, blood clot, irregular heartbeat, and palpitations. GASTROINTESTINAL: Hemorrhoids.  Negative for reflux, nausea, vomiting, diarrhea, constipation, change in bowel habits, and abdominal pain. GENITOURINARY: Hx TURP for BPH- no current urinary issues. Follows with urologist yearly. Negative for difficulty of urination, burning with urination, and frequency. INTEGUMENT: Negative for rash, skin lesions, and easy bruising. Instructed pt to call Dr. Antony Zaldivar as soon as possible if a rash or wound develops prior to surgery. Pt voiced understanding. HEMATOLOGIC/LYMPHATIC: Negative for swelling/edema. ALLERGIC/IMMUNOLOGIC: Negative for urticaria and itching. ENDOCRINE: Negative for increase in thirst, increase in urination, and heat or cold intolerance. MUSCULOSKELETAL: See HPI. NEUROLOGICAL: Neuropathy hands and feet. Negative for headaches, dizziness, lightheadedness  BEHAVIOR/PSYCH: Negative for depression and anxiety. Physical Exam:   BP (!) 148/70   Pulse 85   Temp 98.2 °F (36.8 °C) (Oral)   Resp 16   Ht 6' 1\" (1.854 m)   Wt 251 lb 5.2 oz (114 kg)   SpO2 97%   BMI 33.16 kg/m²   No LMP for male patient. No obstetric history on file. No results for input(s): POCGLU in the last 72 hours. General Appearance:  Alert, well appearing, and in no acute distress. Mental status:  Oriented to person, place, and time. Head:  Normocephalic and atraumatic. Eye: Glasses. No icterus, redness, pupils equal and reactive, extraocular eye movements intact, and conjunctiva clear. Ear:  Hearing grossly intact. Nose:  No drainage noted. Mouth: Full dentures. Mucous membranes moist.  Neck:  Supple and no carotid bruits noted. Lungs:  Bilateral equal air entry, clear to auscultation, no wheezing, rales or rhonchi, and normal effort. Cardiovascular:  Normal rate, regular rhythm, no murmur, gallop, or rub. Abdomen: Obese. Soft, non-tender, non-distended, and active bowel sounds. Neurologic: Antalgic gait with quad cane. Normal speech and cranial nerves II through XII grossly intact. Strength 5/5 bilaterally. Skin: Dry skin all extremities.  No gross lesions, rashes, bruising, or bleeding on exposed skin area. Extremities: Chronic calf tenderness due to radiating back pain. Posterior tibial pulses 2+ bilaterally. No pedal edema. Psych:  Normal affect.       Investigations:       Laboratory Testing:  Recent Results         Recent Results (from the past 24 hour(s))   EKG 12 Lead     Collection Time: 01/24/23 11:26 AM   Result Value Ref Range     Ventricular Rate 69 BPM     Atrial Rate 69 BPM     P-R Interval 146 ms     QRS Duration 90 ms     Q-T Interval 354 ms     QTc Calculation (Bazett) 379 ms     P Axis 58 degrees     R Axis -7 degrees     T Axis 45 degrees   Basic Metabolic Panel     Collection Time: 01/24/23 11:31 AM   Result Value Ref Range     Glucose 129 (H) 70 - 99 mg/dL     BUN 11 8 - 23 mg/dL     Creatinine 0.96 0.70 - 1.20 mg/dL     Est, Glom Filt Rate >60 >60 mL/min/1.73m2     Bun/Cre Ratio 11 9 - 20     Calcium 9.5 8.6 - 10.4 mg/dL     Sodium 138 135 - 144 mmol/L     Potassium 4.6 3.7 - 5.3 mmol/L     Chloride 106 98 - 107 mmol/L     CO2 21 20 - 31 mmol/L     Anion Gap 11 9 - 17 mmol/L   CBC with Auto Differential     Collection Time: 01/24/23 11:31 AM   Result Value Ref Range     WBC 6.4 3.5 - 11.3 k/uL     RBC 5.17 4.21 - 5.77 m/uL     Hemoglobin 15.8 13.0 - 17.0 g/dL     Hematocrit 48.7 40.7 - 50.3 %     MCV 94.2 82.6 - 102.9 fL     MCH 30.6 25.2 - 33.5 pg     MCHC 32.4 28.4 - 34.8 g/dL     RDW 13.1 11.8 - 14.4 %     Platelets 718 423 - 729 k/uL     MPV 10.8 8.1 - 13.5 fL     NRBC Automated 0.0 0.0 per 100 WBC     Seg Neutrophils 56 36 - 65 %     Lymphocytes 32 24 - 43 %     Monocytes 10 3 - 12 %     Eosinophils % 1 1 - 4 %     Basophils 1 0 - 2 %     Immature Granulocytes 0 0 %     Segs Absolute 3.56 1.50 - 8.10 k/uL     Absolute Lymph # 2.03 1.10 - 3.70 k/uL     Absolute Mono # 0.65 0.10 - 1.20 k/uL     Absolute Eos # 0.09 0.00 - 0.44 k/uL     Basophils Absolute 0.05 0.00 - 0.20 k/uL     Absolute Immature Granulocyte 0.01 0.00 - 0.30 k/uL   SPECIMEN REJECTION     Collection Time: 23 11:31 AM   Result Value Ref Range     Specimen Source . BLOOD       Ordered Test PT,PTT       Reason for Rejection Unable to perform testing: Specimen hemolyzed. Recent Labs     23  1131   HGB 15.8   HCT 48.7   WBC 6.4   MCV 94.2      K 4.6      CO2 21   BUN 11   CREATININE 0.96   GLUCOSE 129*         No results for input(s): COVID19 in the last 720 hours. *Please note that labs listed above are the most recent lab values available in EPIC at the time of the visit and additional labs may have been drawn or resulted since that time. Imaging/Diagnostics:        No results found. EK2023. See Epic. Diagnosis:       1. Spinal stenosis of lumbar region, unspecified whether neurogenic claudication present [M48.061]. Plans:      1.  L 3-5 LUMBAR LAMINECTOMY POSTERIOR  - SAMEERA Espinal - CNP  2023  12:05 PM               Cosigned by: Sabrina Conde MD at 2023 10:38 AM

## 2023-02-15 NOTE — CONSULTS
[facility]  Office: [address], , Marin Benne, DO, Savannahlizy Johnson, DO, Sangita Tang, DO, Carlene Mckinney MD, Johan Holloway MD, Silas Serrato MD, Meka Henning MD,  Chinyere Oswald MD, Glo Moritz, MD, Ozzy Capps DO, Pradeep Schrader MD,  Glory Nguyen MD, Corby Lyons MD, Charo Mancia DO, Doyne Apgar, MD, Marie Gardner MD, Albin Dickerson DO, Franchesca Chua MD, Pricilla Anguiano MD, Rebeca Brady MD, Jami Lei MD, Phong Hudson DO, Cheyenne Horn MD, Windy Ferreira MD, Gloria Camarena, CNP,  Kirsten Eller, CNP, Karlene Maki, CNP, Suzanne Champagne, CNP,  Nate Batista, [facility], Yoselin Garza, CNP, Colonel Rider, CNP, Yamile Kumar, CNP, Tiffanie Johnson, CNP, Jayme Yao, CNP, An Alberts PA-C, Addison Mistry, CNS, Herlinda Lockhart, CNP, Emerita Shrap [facility] / HISTORY AND PHYSICAL EXAMINATION            Date:   2/15/2023  Patient name:  Morgan Barker  Date of admission:  2/15/2023  5:45 AM  MRN:   [id number]  Account:  [de-identified]  YOB: 1945  PCP:    Marni Mcdonald MD  Room:     Code Status:    Full Code    Physician Requesting Consult: Nyla Rivera MD    Reason for Consult:  medical mx    Chief Complaint:     Back pain and elective laminectomy    History Obtained From:     patient, electronic medical record    History of Present Illness:     70yrs old male with past medical history of hypertension, renal cyst presents to the hospital for elective laminectomy for back pain. [facility] is consulted for medical management. Patient reports only hypertension as his medical issue. He feels much better after surgery and pain is under control. No chest pain or shortness of breath. His appetite is good. He smokes but is trying to quit, smokes around half packs daily, occasional alcohol use, no drug use.       Past Medical History: Past Medical History:   Diagnosis Date    Arthritis     BPH (benign prostatic hyperplasia)     Cystic kidney disease     DDD (degenerative disc disease), lumbar     Hypertension     Renal mass     Spondylolisthesis of lumbar region         Past Surgical History:     Past Surgical History:   Procedure Laterality Date    COLONOSCOPY      CYSTOSCOPY  2012    LAMINECTOMY N/A 2/15/2023    L 3-5 LUMBAR LAMINECTOMY POSTERIOR  - GABRIELA performed by Melany Valdez MD at [facility]  2006        Medications Prior to Admission:     Prior to Admission medications    Medication Sig Start Date End Date Taking? Authorizing Provider   lisinopril (PRINIVIL;ZESTRIL) 20 MG tablet take 1 tablet by mouth once daily 11/30/22   Historical Provider, MD        Allergies:     Patient has no known allergies. Social History:     Tobacco:    reports that he has been smoking cigarettes. He started smoking about 63 years ago. He has been smoking an average of .5 packs per day. He has never used smokeless tobacco.  Alcohol:      reports current alcohol use. Drug Use:  has no history on file for drug use. Family History:     Family history reviewed, patient does not give me any history of heart disease in family. Could not think of anything else. Review of Systems:     Positive and Negative as described in HPI. CONSTITUTIONAL:  negative for fevers, chills, sweats, fatigue, weight loss  HEENT:  negative for vision, hearing changes, runny nose, throat pain  RESPIRATORY:  negative for shortness of breath, cough, congestion, wheezing. CARDIOVASCULAR:  negative for chest pain, palpitations.   GASTROINTESTINAL:  negative for nausea, vomiting, diarrhea, constipation, change in bowel habits, abdominal pain   GENITOURINARY:  negative for difficulty of urination, burning with urination, frequency   INTEGUMENT:  negative for rash, skin lesions, easy bruising   HEMATOLOGIC/LYMPHATIC:  negative for swelling/edema   ALLERGIC/IMMUNOLOGIC: negative for urticaria , itching  ENDOCRINE:  negative increase in drinking, increase in urination, hot or cold intolerance  MUSCULOSKELETAL:  negative joint pains, muscle aches, swelling of joints  NEUROLOGICAL:  negative for headaches, dizziness, lightheadedness, numbness, pain, tingling extremities  BEHAVIOR/PSYCH:  negative for depression, anxiety    Physical Exam:     /81   Pulse 83   Temp 97.7 °F (36.5 °C) (Oral)   Resp 18   Ht 6' 1\" (1.854 m)   Wt 251 lb (113.9 kg)   SpO2 95%   BMI 33.12 kg/m²   Temp (24hrs), Av °F (36.7 °C), Min:97.3 °F (36.3 °C), Max:98.9 °F (37.2 °C)    No results for input(s): POCGLU in the last 72 hours. Intake/Output Summary (Last 24 hours) at 2/15/2023 1847  Last data filed at 2/15/2023 1245  Gross per 24 hour   Intake 2232 ml   Output 300 ml   Net 1932 ml       General Appearance:  alert, well appearing, and in no acute distress  Mental status: oriented to person, place, and time with normal affect  Head:  normocephalic, atraumatic. Eye: no icterus, redness, pupils equal and reactive, extraocular eye movements intact, conjunctiva clear  Ear: normal external ear, no discharge, hearing intact  Nose:  no drainage noted  Mouth: mucous membranes moist  Neck: supple, no carotid bruits, thyroid not palpable  Lungs: Bilateral equal air entry, clear to ausculation, no wheezing, rales or rhonchi, normal effort  Cardiovascular: normal rate, regular rhythm, no murmur, gallop, rub.   Abdomen: Soft, nontender, nondistended, normal bowel sounds, no hepatomegaly or splenomegaly  Neurologic: There are no new focal motor or sensory deficits, normal muscle tone and bulk, no abnormal sensation, normal speech, cranial nerves II through XII grossly intact  Skin: No gross lesions, rashes, bruising or bleeding on exposed skin area  Extremities:  peripheral pulses palpable, no pedal edema or calf pain with palpation  Psych: normal affect     Investigations:      Laboratory Testing:  Recent Results (from the past 24 hour(s))   Protime-INR    Collection Time: 02/15/23  6:29 AM   Result Value Ref Range    Protime 12.5 11.5 - 14.2 sec    INR 0.9    APTT    Collection Time: 02/15/23  6:29 AM   Result Value Ref Range    PTT 28.3 23.9 - 33.8 sec   CBC with Auto Differential    Collection Time: 02/15/23  6:12 PM   Result Value Ref Range    WBC 10.4 3.5 - 11.3 k/uL    RBC 4.54 4.21 - 5.77 m/uL    Hemoglobin 14.0 13.0 - 17.0 g/dL    Hematocrit 42.3 40.7 - 50.3 %    MCV 93.2 82.6 - 102.9 fL    MCH 30.8 25.2 - 33.5 pg    MCHC 33.1 28.4 - 34.8 g/dL    RDW 13.1 11.8 - 14.4 %    Platelets 905 059 - 598 k/uL    MPV 10.1 8.1 - 13.5 fL    NRBC Automated 0.0 0.0 per 100 WBC    Seg Neutrophils 88 (H) 36 - 65 %    Lymphocytes 8 (L) 24 - 43 %    Monocytes 4 3 - 12 %    Eosinophils % 0 (L) 1 - 4 %    Basophils 0 0 - 2 %    Immature Granulocytes 0 0 %    Segs Absolute 9.10 (H) 1.50 - 8.10 k/uL    Absolute Lymph # 0.83 (L) 1.10 - 3.70 k/uL    Absolute Mono # 0.38 0.10 - 1.20 k/uL    Absolute Eos # <0.03 0.00 - 0.44 k/uL    Basophils Absolute <0.03 0.00 - 0.20 k/uL    Absolute Immature Granulocyte 0.03 0.00 - 0.30 k/uL   Basic Metabolic Panel    Collection Time: 02/15/23  6:12 PM   Result Value Ref Range    Glucose 196 (H) 70 - 99 mg/dL    BUN 13 8 - 23 mg/dL    Creatinine 1.20 0.70 - 1.20 mg/dL    Est, Glom Filt Rate >60 >60 mL/min/1.73m2    Bun/Cre Ratio 11 9 - 20    Calcium 9.1 8.6 - 10.4 mg/dL    Sodium 133 (L) 135 - 144 mmol/L    Potassium 4.7 3.7 - 5.3 mmol/L    Chloride 99 98 - 107 mmol/L    CO2 23 20 - 31 mmol/L    Anion Gap 11 9 - 17 mmol/L       Imaging/Diagonstics:  No results found.     Assessment :      Hospital Problems             Last Modified POA    * (Principal) Lumbar stenosis with neurogenic claudication (Chronic) 2/15/2023 Yes    Class 1 obesity due to excess calories with serious comorbidity in adult 2/15/2023 Yes    Primary hypertension 2/15/2023 Yes    Hyperglycemia 2/15/2023 Yes    Tobacco abuse 2/15/2023 Yes       Plan:     Resume lisinopril  Monitor vitals  Patient has history of TURP, follows with urology outpatient  Monitor urine output  Monitor vitals  Labs reviewed  Check POCT blood sugar every 6 and cover with sliding scale  Check hba1c  Encouraged to quit smoking  Needs lifestyle modifications for weight loss  H/o renal cyst- follow up with pcp    Consultations:   Glover Heimlich, MD  2/15/2023  6:47 PM    Copy sent to Dr. Ksenia Marley MD

## 2023-02-15 NOTE — ANESTHESIA POSTPROCEDURE EVALUATION
Department of Anesthesiology  Postprocedure Note    Patient: Mel Smallwood  MRN: [id number]  YOB: 1945  Date of evaluation: 2/15/2023      Procedure Summary     Date: 02/15/23 Room / Location: [address]    Anesthesia Start: 0730 Anesthesia Stop: 0519    Procedure: L 3-5 [address] [facility]  (Back) Diagnosis:       Spinal stenosis of lumbar region, unspecified whether neurogenic claudication present      (Spinal stenosis of lumbar region, unspecified whether neurogenic claudication present [M48.061])    Surgeons: Manny Coker MD Responsible Provider: Saurabh Blair MD    Anesthesia Type: general ASA Status: 3          Anesthesia Type: No value filed.     Arely Phase I: Arely Score: 8    Arely Phase II:        Anesthesia Post Evaluation    Patient location during evaluation: PACU  Patient participation: complete - patient participated  Level of consciousness: awake  Airway patency: patent  Nausea & Vomiting: no nausea  Complications: no  Cardiovascular status: blood pressure returned to baseline  Respiratory status: acceptable  Hydration status: euvolemic  Comments: Multimodal analgesia pain management as indicated by procedure  Multimodal analgesia pain management approach Problem: Patient Care Overview  Goal: Plan of Care Review  Outcome: Revised  POC reviewed with Ms. Blue and  at 1500. Pt and pt's  verbalized understanding. Questions and concerns addressed. No acute events today. Pan management today with fentanyl, heat packs and one time dose of steroid. Steroid shot helped to control pressure HA more. TCD today showed higher velocity on R MCA, SBP <180 for 24hrs. IVF increased to 100ml/h. High risk for vasospasm, hourly neuro exam followed very closely, no change in exam throughout shift. I&O monitored closely.  Pt progressing toward goals. Will continue to monitor. See flowsheets for full assessment and VS info.

## 2023-02-15 NOTE — PROGRESS NOTES
Physical Therapy  Facility/Department: [facility] MED SURG  Physical Therapy Initial Assessment    Name: Lilian Post  : 1945  MRN: [id number]  Date of Service: 2/15/2023    Discharge Recommendations:  Patient would benefit from continued therapy after discharge   PT Equipment Recommendations  Equipment Needed: Yes  Mobility Devices: Mardell Winnie: Rolling    Pt presented to surgery on 2/15/23 for:   POSTERIOR L 3-5 LUMBAR LAMINECTOMY secondary Spinal stenosis of lumbar region. RN reports patient is medically stable for therapy treatment this date. Chart reviewed prior to treatment and patient is agreeable for therapy. Patient Diagnosis(es): There were no encounter diagnoses. Past Medical History:  has a past medical history of Arthritis, BPH (benign prostatic hyperplasia), Cystic kidney disease, DDD (degenerative disc disease), lumbar, Hypertension, Renal mass, and Spondylolisthesis of lumbar region. Past Surgical History:  has a past surgical history that includes TURP (); Cystoscopy (); Colonoscopy; and laminectomy (N/A, 2/15/2023). Assessment   Body Structures, Functions, Activity Limitations Requiring Skilled Therapeutic Intervention: Decreased functional mobility ; Decreased ADL status; Decreased safe awareness;Decreased endurance;Decreased balance;Decreased posture  Assessment: Pt tolerated session with deficits noted in bed mobility, transfers, ambulation, balance, posture, safety awareness and endurance this session. Pt to benefit with cont'd PT for functional mobility, gait & stair training & for pt education. Pt currently functioning below baseline with AM-PAC mobility score of 13/24.  Pt requires cont'd PT & anticipate Pt should be appropriate to D/C Home with assist POD 1, and  recommend OP PT for reconditioning per Dr Alysa Argueta once pt is off spinal precautions  Therapy Prognosis: Good  Decision Making: Medium Complexity  Exam: ROM, MMT, functional mobility, activity tolerance, Balance, & Oral Hurry AM-PAC 6 Clicks Basic Mobility  Clinical Presentation: evolving  Requires PT Follow-Up: Yes  Activity Tolerance  Activity Tolerance: Patient limited by endurance and safety awareness    Plan   Physcial Therapy Plan  General Plan: 6-7 times per week  Current Treatment Recommendations: Strengthening, Balance training, Functional mobility training, Transfer training, ADL/Self-care training, Endurance training, Gait training, Stair training, Home exercise program, Safety education & training, Patient/Caregiver education & training, Positioning  Safety Devices  Type of Devices:  All fall risk precautions in place, Bed alarm in place, Call light within reach, Gait belt, Heels elevated for pressure relief, Patient at risk for falls, Left in bed, Nurse notified     Restrictions  Restrictions/Precautions  Restrictions/Precautions: General Precautions, Fall Risk, Surgical Protocols  Required Braces or Orthoses?: No  Position Activity Restriction  Spinal Precautions: No Bending, No Lifting, No Twisting  Other position/activity restrictions: ambulate, activity as tolerated, telemetry, L hand IV, ALARMS     Subjective   General  Chart Reviewed: Yes  Patient assessed for rehabilitation services?: Yes  Additional Pertinent Hx: OA, DDD, HTN  Response To Previous Treatment: Not applicable  General Comment  Comments: RN okays PT  Subjective  Subjective: Pt agreeable to PT         Social/Functional History  Social/Functional History  Lives With: Spouse (Ex spouse)  Type of Home: House  Home Layout: Multi-level  Home Access: Stairs to enter without rails  Entrance Stairs - Number of Steps: 2  Bathroom Shower/Tub: Tub/Shower unit  Bathroom Toilet: Standard (pt has close vanity for support)  Bathroom Equipment: Grab bars in shower, Shower chair  Home Equipment: Rico Keller  Has the patient had two or more falls in the past year or any fall with injury in the past year?: No  Receives Help From: Family (pt has supportive ex spouse & local adult children who are alos very supportive)  ADL Assistance: Independent  Homemaking Assistance: Independent  Homemaking Responsibilities: Yes  Ambulation Assistance: Independent (used cane)  Transfer Assistance: Independent  Active : Yes  Mode of Transportation: Car  Occupation: Retired  Type of Occupation: [employer]  Leisure & Hobbies: gym  [address]: Impaired  Vision Exceptions: Wears glasses at all times  Hearing  Hearing: Exceptions to [facility]  Hearing Exceptions: Hard of hearing/hearing concerns; No hearing aid    Cognition   Orientation  Overall Orientation Status: Within Functional Limits  Orientation Level: Oriented X4  Cognition  Overall Cognitive Status: Exceptions  Arousal/Alertness: Appropriate responses to stimuli  Following Commands:  Follows all commands without difficulty  Attention Span: Appears intact  Memory: Appears intact  Safety Judgement: Decreased awareness of need for assistance;Decreased awareness of need for safety  Problem Solving: Decreased awareness of errors;Assistance required to identify errors made;Assistance required to correct errors made  Insights: Decreased awareness of deficits  Initiation: Requires cues for some  Sequencing: Does not require cues     Objective   Heart Rate: 83  Heart Rate Source: Monitor  BP: 113/81  BP Location: Right upper arm  Patient Position: Supine  MAP (Calculated): 92  Resp: 18  SpO2: 95 %  O2 Device: None (Room air)     Observation/Palpation  Posture: Fair  Observation: LUE IV  Edema: none  Scar: new post op incision low back covered by dressing  Gross Assessment  Sensation: Intact (Pt denies paresthesias)     AROM RLE (degrees)  RLE AROM: WFL  AROM LLE (degrees)  LLE AROM : WFL  AROM RUE (degrees)  RUE AROM : WFL  AROM LUE (degrees)  LUE AROM : WFL  Strength RLE  Strength RLE: WFL  Comment: deferred hip 2* s/p lumbar laminectomy L3-5 (2/15/23)  Strength LLE  Strength LLE: WFL  Comment: deferred hip 2* s/p lumbar laminectomy L3-5 (2/15/23)  Strength RUE  Strength RUE: WFL  Strength LUE  Strength LUE: WFL           Bed mobility  Rolling to Left: Minimal assistance  Supine to Sit: Moderate assistance  Sit to Supine: Minimal assistance  Scooting: Minimal assistance  Bed Mobility Comments: ED technique for rolling, use of UB, how to drop legs off EOB & use UB to come to sit in order to adhere to spinal precautions. Pt needed MOD verbal instruction for flexing knees and proper log rolling technique, as well as manual cues with tactile assist for UE hand placement on rail, pacing and pursed lip breathing, awareness/assist with line mgt, with increased time needed. Transfers  Sit to Stand: Minimal Assistance  Stand to Sit: Minimal Assistance  Bed to Chair: Minimal assistance  Stand Pivot Transfers: Minimal Assistance  Lateral Transfers: Minimal Assistance  Comment: MOD VC + tactile assist on correct use of upper body for safe sit/stand, nose over toes tech, upright posture, pacing + to back all way back to surface with walker CLOSE until he feels touch behind legs, & to ensure he reaches with UB support to surface sitting to, AND to slow down & take time for transitions to make sure he has no feeling of unsteadiness or dizzy to reduce fall risk  Ambulation  Surface: Level tile  Device: Rolling Walker  Assistance: Moderate assistance  Quality of Gait: step to pattern, MOD cues to keep walker close at all times & to amb INSIDE base of walker & upright posture for safety/scanning AND to slow pace to reduce fall risk  Gait Deviations: Decreased step length;Decreased step height;Decreased head and trunk rotation  Distance: 20ftx2  Comments: Pt amb to BR for toileting, MOD Assistance to sit to toilet.  Pt stood with MIN Assistance, & amb 3ft to sink for hand hygiene x 2 minutes then ambulated 20 more ft with R/walker & sat to EOB with Lisa     Balance  Posture: Fair  Sitting - Static: Good  Sitting - Dynamic: Good;-  Standing - Static: Good;- (R/W)  Standing - Dynamic: Fair;+ (R/W)  Single Leg Stance R Le  Single Leg Stance L Le  Circulation/Endurance Exercises: ankle pumps x 20  Pressure Relief Exercises: WS supine & seated x 5  Static Sitting Balance Exercises: seated EOB with Cameron foot placement x 8 minutes with CGA  Static Standing Balance Exercises: stood 2 minutes with R/walker & Lisa  Breathing Techniques: pursed lip breathing techniques  & deep breathing with incentive spirometer including technique, frequency and purpose, completed 10 reps  Disease-specific Exercises: Ed spinal precautions, bed mobility within spinal precautions, posture, safety & fall prevention        OutComes Score                                                  AM-PAC Score  AM-PAC Inpatient Mobility Raw Score : 13 (02/15/23 1603)  AM-PAC Inpatient T-Scale Score : 36.74 (02/15/23 1603)  Mobility Inpatient CMS 0-100% Score: 64.91 (02/15/23 1603)  Mobility Inpatient CMS G-Code Modifier : CL (02/15/23 1603)          Tinneti Score       Goals  Short Term Goals  Time Frame for Short Term Goals: 6 visits  Short Term Goal 1: Inc bed-mobility & transfers to independent to enable pt to safely get in/OOB(with spinal precautions) & chair to return to PLOF & decrease risk for falls  Short Term Goal 2:  Inc gait to amb 350ft or > indep w/ RW to enable pt to return to previous level of independence & able to demonstrate indep/ safe use of RW in functional activities including approaching surfaces and turning to sit,  & promotion of home walking program  Short Term Goal 3: Pt able to go up/down 2 steps with CLOSE supervision  Short Term Goal 4: Pt able to tolerate 30 min of activity to include ex, NMR & functional mobility with device to facilitate activity tolerance to [facility]  Short Term Goal 5: Ed spinal precautions, bed mobility within spinal precautions, posture, safety & fall prevention, prevention sedentary complications, Home walking program, & issue written Pt Ed       Education  Patient Education  Education Given To: Patient  Education Provided: Role of Therapy; Fall Prevention Strategies; Plan of Care;Transfer Training;Energy Conservation;Precautions; Equipment  Education Provided Comments: Pt educated on purpose of acute PT eval, importance of continued mobility throughout admission, spinal precautions, safety awareness, fall risk prevention, safe transfers & ambulation w/ RW, circulation ex's, breathing techniques, prevention of sedentary complications, and PT POC. Pt demonstrated FAIR carryover  Pt requires continued reinforcement of education.   Education Method: Demonstration;Verbal  Education Outcome: Continued education needed      Therapy Time   Individual Concurrent Group Co-treatment   Time In 1553         Time Out 7245         Minutes 59          Treatment time: 49 minutes  Additional 10 minutes for chart review               [address], PT

## 2023-02-15 NOTE — DISCHARGE INSTRUCTIONS
No bending or twisting back for 6 weeks  No lifting over 15 pounds for 6 weeks  Dry dressing changes daily x 1wk. Start in 2 days  No NSAIDS x 5 days  Stiches are dissolvable and do not need to be removed  Call for any fevers, wound redness, swelling or drainage after 4 days [phone]  May shower in 2 days  No tub baths for 6 weeks      Sharlene Rea MD  [facility]  Spine Surgeon  Keep it Clean - Post-Operative Home instructions    These instructions are to help you have the best possible recovery after your surgical procedure. Jose Megha is here to support you. If you have questions, call [phone] Monday through Friday from 7:30AM to 8:30PM to speak to a nurse. If you need to speak to someone outside of these hours, call your physician. Incision Dos and Donts  Do wash hands before and after dressing changes or when you have had any contact with your incision. Use hand  or antibacterial soap. Do keep your incision clean and dry. Its OK to wash the skin around your incision with mild soap and water. Do change your dressing as you were told. Do notify your doctor if the dressing becomes wet or dirty. Do use a clean washcloth every time when cleaning your incision. Do sleep on clean linens. Do keep pets away from incision site. Dont sit in a bathtub, pool, or hot tub until your incision is fully closed and any drains are removed. Dont scrub, pick, scratch, or pull at your incision. Dont use oils, lotions, or creams on your incision unless your healthcare provider approves it. Follow-up  You will have one or more follow-up visits with your healthcare provider. These are needed to check how well youre healing. Your drain, stitches, or staples may also be removed during these visits. Do not miss your follow-up visit, even if you are feeling better.       Call your healthcare provider right away if you have the following:  Fever of 100.4°F (38°C) or higher, or as advised by your healthcare provider. Chest pain or trouble breathing. Pain or tenderness in your leg(s). Increased pain, redness, swelling, bleeding, or foul-smelling drainage at the incision site. Incision changes, separates or is hot to the touch. Problems with the drain if you have one. Itchy, swollen skin; skin rash.     Medicines, Diet, and Activity:   Refer to your discharge paperwork for further instructions

## 2023-02-15 NOTE — ACP (ADVANCE CARE PLANNING)
Advance Care Planning     Advance Care Planning Activator (Inpatient)  Conversation Note      Date of ACP Conversation: 2/15/2023     Conversation Conducted with: Patient with Decision Making Capacity    ACP Activator: Isabela Ledesma RN    {When Decision Maker makes decisions on behalf of the incapacitated patient: Decision Maker is asked to consider and make decisions based on patient values, known preferences, or best interests.     Health Care Decision Maker:     Current Designated Health Care Decision Maker:     Primary Decision Maker: [place] - Other - [phone]  Click here to complete Healthcare Decision Makers including section of the Healthcare Decision Maker Relationship (ie \"Primary\")      Care Preferences    Ventilation:  \"If you were in your present state of health and suddenly became very ill and were unable to breathe on your own, what would your preference be about the use of a ventilator (breathing machine) if it were available to you?\"      Would the patient desire the use of ventilator (breathing machine)?: yes    \"If your health worsens and it becomes clear that your chance of recovery is unlikely, what would your preference be about the use of a ventilator (breathing machine) if it were available to you?\"     Would the patient desire the use of ventilator (breathing machine)?: No      Resuscitation  \"CPR works best to restart the heart when there is a sudden event, like a heart attack, in someone who is otherwise healthy. Unfortunately, CPR does not typically restart the heart for people who have serious health conditions or who are very sick.\"    \"In the event your heart stopped as a result of an underlying serious health condition, would you want attempts to be made to restart your heart (answer \"yes\" for attempt to resuscitate) or would you prefer a natural death (answer \"no\" for do not attempt to resuscitate)?\" yes       [] Yes   [x] No   Educated Patient / Decision Maker  regarding differences between Advance Directives and portable DNR orders.     Length of ACP Conversation in minutes:  5    Conversation Outcomes:  [x] ACP discussion completed  [] Existing advance directive reviewed with patient; no changes to patient's previously recorded wishes  [] New Advance Directive completed  [] Portable Do Not Rescitate prepared for Provider review and signature  [] POLST/POST/MOLST/MOST prepared for Provider review and signature      Follow-up plan:    [] Schedule follow-up conversation to continue planning  [] Referred individual to Provider for additional questions/concerns   [] Advised patient/agent/surrogate to review completed ACP document and update if needed with changes in condition, patient preferences or care setting    [] This note routed to one or more involved healthcare providers

## 2023-02-16 VITALS
RESPIRATION RATE: 16 BRPM | DIASTOLIC BLOOD PRESSURE: 77 MMHG | HEIGHT: 73 IN | OXYGEN SATURATION: 94 % | BODY MASS INDEX: 33.27 KG/M2 | HEART RATE: 98 BPM | TEMPERATURE: 97.9 F | SYSTOLIC BLOOD PRESSURE: 144 MMHG | WEIGHT: 251 LBS

## 2023-02-16 LAB
ABSOLUTE EOS #: 0 K/UL (ref 0–0.44)
ABSOLUTE IMMATURE GRANULOCYTE: 0 K/UL (ref 0–0.3)
ABSOLUTE LYMPH #: 1.64 K/UL (ref 1.1–3.7)
ABSOLUTE MONO #: 1.51 K/UL (ref 0.1–1.2)
ANION GAP SERPL CALCULATED.3IONS-SCNC: 8 MMOL/L (ref 9–17)
BASOPHILS # BLD: 0 % (ref 0–2)
BASOPHILS ABSOLUTE: 0 K/UL (ref 0–0.2)
BUN SERPL-MCNC: 13 MG/DL (ref 8–23)
BUN/CREAT BLD: 14 (ref 9–20)
CALCIUM SERPL-MCNC: 8.8 MG/DL (ref 8.6–10.4)
CHLORIDE SERPL-SCNC: 104 MMOL/L (ref 98–107)
CO2 SERPL-SCNC: 25 MMOL/L (ref 20–31)
CREAT SERPL-MCNC: 0.96 MG/DL (ref 0.7–1.2)
EOSINOPHILS RELATIVE PERCENT: 0 % (ref 1–4)
EST. AVERAGE GLUCOSE BLD GHB EST-MCNC: 140 MG/DL
GFR SERPL CREATININE-BSD FRML MDRD: >60 ML/MIN/1.73M2
GLUCOSE BLD-MCNC: 133 MG/DL (ref 75–110)
GLUCOSE SERPL-MCNC: 129 MG/DL (ref 70–99)
HBA1C MFR BLD: 6.5 % (ref 4–6)
HCT VFR BLD AUTO: 37.8 % (ref 40.7–50.3)
HGB BLD-MCNC: 12.3 G/DL (ref 13–17)
IMMATURE GRANULOCYTES: 0 %
LYMPHOCYTES # BLD: 12 % (ref 24–43)
MCH RBC QN AUTO: 30 PG (ref 25.2–33.5)
MCHC RBC AUTO-ENTMCNC: 32.5 G/DL (ref 28.4–34.8)
MCV RBC AUTO: 92.2 FL (ref 82.6–102.9)
MONOCYTES # BLD: 11 % (ref 3–12)
NRBC AUTOMATED: 0 PER 100 WBC
PDW BLD-RTO: 13 % (ref 11.8–14.4)
PLATELET # BLD AUTO: 192 K/UL (ref 138–453)
PMV BLD AUTO: 10.2 FL (ref 8.1–13.5)
POTASSIUM SERPL-SCNC: 4.6 MMOL/L (ref 3.7–5.3)
RBC # BLD: 4.1 M/UL (ref 4.21–5.77)
SEG NEUTROPHILS: 77 % (ref 36–65)
SEGMENTED NEUTROPHILS ABSOLUTE COUNT: 10.55 K/UL (ref 1.5–8.1)
SODIUM SERPL-SCNC: 137 MMOL/L (ref 135–144)
WBC # BLD AUTO: 13.7 K/UL (ref 3.5–11.3)

## 2023-02-16 PROCEDURE — 2580000003 HC RX 258: Performed by: ORTHOPAEDIC SURGERY

## 2023-02-16 PROCEDURE — 97116 GAIT TRAINING THERAPY: CPT

## 2023-02-16 PROCEDURE — 82947 ASSAY GLUCOSE BLOOD QUANT: CPT

## 2023-02-16 PROCEDURE — 97530 THERAPEUTIC ACTIVITIES: CPT

## 2023-02-16 PROCEDURE — 6360000002 HC RX W HCPCS: Performed by: ORTHOPAEDIC SURGERY

## 2023-02-16 PROCEDURE — 36415 COLL VENOUS BLD VENIPUNCTURE: CPT

## 2023-02-16 PROCEDURE — 80048 BASIC METABOLIC PNL TOTAL CA: CPT

## 2023-02-16 PROCEDURE — 85025 COMPLETE CBC W/AUTO DIFF WBC: CPT

## 2023-02-16 PROCEDURE — 6370000000 HC RX 637 (ALT 250 FOR IP): Performed by: ORTHOPAEDIC SURGERY

## 2023-02-16 PROCEDURE — 97110 THERAPEUTIC EXERCISES: CPT

## 2023-02-16 RX ORDER — SENNA AND DOCUSATE SODIUM 50; 8.6 MG/1; MG/1
1 TABLET, FILM COATED ORAL 2 TIMES DAILY
Qty: 60 TABLET | Refills: 0 | Status: ON HOLD | OUTPATIENT
Start: 2023-02-16

## 2023-02-16 RX ORDER — TIZANIDINE 4 MG/1
4 TABLET ORAL EVERY 8 HOURS PRN
Qty: 50 TABLET | Refills: 0 | Status: ON HOLD | OUTPATIENT
Start: 2023-02-16

## 2023-02-16 RX ORDER — HYDROCODONE BITARTRATE AND ACETAMINOPHEN 5; 325 MG/1; MG/1
1-2 TABLET ORAL EVERY 4 HOURS PRN
Qty: 60 TABLET | Refills: 0 | Status: SHIPPED | OUTPATIENT
Start: 2023-02-16 | End: 2023-02-23

## 2023-02-16 RX ADMIN — SODIUM CHLORIDE: 9 INJECTION, SOLUTION INTRAVENOUS at 00:45

## 2023-02-16 RX ADMIN — Medication 2000 MG: at 00:46

## 2023-02-16 RX ADMIN — HYDROCODONE BITARTRATE AND ACETAMINOPHEN 2 TABLET: 5; 325 TABLET ORAL at 00:46

## 2023-02-16 RX ADMIN — HYDROCODONE BITARTRATE AND ACETAMINOPHEN 2 TABLET: 5; 325 TABLET ORAL at 10:40

## 2023-02-16 RX ADMIN — SENNOSIDES AND DOCUSATE SODIUM 1 TABLET: 50; 8.6 TABLET ORAL at 08:33

## 2023-02-16 RX ADMIN — HYDROCODONE BITARTRATE AND ACETAMINOPHEN 2 TABLET: 5; 325 TABLET ORAL at 06:40

## 2023-02-16 ASSESSMENT — PAIN DESCRIPTION - LOCATION: LOCATION: BACK

## 2023-02-16 ASSESSMENT — PAIN DESCRIPTION - DESCRIPTORS: DESCRIPTORS: ACHING;DISCOMFORT;SORE

## 2023-02-16 ASSESSMENT — PAIN SCALES - GENERAL
PAINLEVEL_OUTOF10: 6
PAINLEVEL_OUTOF10: 6

## 2023-02-16 NOTE — PROGRESS NOTES
[facility] Ortho Spine  Attending Progress Note  2/16/2023  7:46 AM     Katelyn Mckeon    1945   [id number]      SUBJECTIVE:  doing well. Has been up to bathroom several times. Walking well. Voiding well. Mild residual tingling in feet - same as before surgery. No CP/SOB    OBJECTIVE      Physical      VITALS:  /65   Pulse 77   Temp 97.5 °F (36.4 °C) (Oral)   Resp 18   Ht 6' 1\" (1.854 m)   Wt 251 lb (113.9 kg)   SpO2 92%   BMI 33.12 kg/m²     Dressing C/D/I    NEUROLOGIC: Alert and Oriented x 3. Strength 5/5 HF, 5/5 Q, 5/5 TA, 5/5 EHL, 5/5 GS. 5/5 D, 5/5 B, 5/5 T, 5/5 WE, 5/5 WF, 5/5 I                                                                  Sensation intact.      Data  CBC with Differential:    Lab Results   Component Value Date/Time    WBC 13.7 02/16/2023 06:27 AM    RBC 4.10 02/16/2023 06:27 AM    HGB 12.3 02/16/2023 06:27 AM    HCT 37.8 02/16/2023 06:27 AM     02/16/2023 06:27 AM    MCV 92.2 02/16/2023 06:27 AM    MCH 30.0 02/16/2023 06:27 AM    MCHC 32.5 02/16/2023 06:27 AM    RDW 13.0 02/16/2023 06:27 AM    LYMPHOPCT 12 02/16/2023 06:27 AM    MONOPCT 11 02/16/2023 06:27 AM    BASOPCT 0 02/16/2023 06:27 AM    MONOSABS 1.51 02/16/2023 06:27 AM    LYMPHSABS 1.64 02/16/2023 06:27 AM    EOSABS 0.00 02/16/2023 06:27 AM    BASOSABS 0.00 02/16/2023 06:27 AM     BMP:    Lab Results   Component Value Date/Time     02/16/2023 06:27 AM    K 4.6 02/16/2023 06:27 AM     02/16/2023 06:27 AM    CO2 25 02/16/2023 06:27 AM    BUN 13 02/16/2023 06:27 AM    CREATININE 0.96 02/16/2023 06:27 AM    CALCIUM 8.8 02/16/2023 06:27 AM    GFRAA >60 08/17/2015 02:00 PM    LABGLOM >60 02/16/2023 06:27 AM    GLUCOSE 129 02/16/2023 06:27 AM           Current Inpatient Medications    Current Facility-Administered Medications: lisinopril (PRINIVIL;ZESTRIL) tablet 20 mg, 20 mg, Oral, Daily  0.9 % sodium chloride infusion, , IntraVENous, Continuous  sodium chloride flush 0.9 % injection 5-40 mL, 5-40 mL, IntraVENous, 2 times per day  sodium chloride flush 0.9 % injection 5-40 mL, 5-40 mL, IntraVENous, PRN  0.9 % sodium chloride infusion, , IntraVENous, PRN  morphine (PF) injection 2 mg, 2 mg, IntraVENous, Q2H PRN **OR** morphine sulfate (PF) injection 4 mg, 4 mg, IntraVENous, Q2H PRN  hydrOXYzine HCl (ATARAX) tablet 10 mg, 10 mg, Oral, Q8H PRN  promethazine (PHENERGAN) tablet 12.5 mg, 12.5 mg, Oral, Q6H PRN **OR** ondansetron (ZOFRAN) injection 4 mg, 4 mg, IntraVENous, Q6H PRN  polyethylene glycol (GLYCOLAX) packet 17 g, 17 g, Oral, Daily  sennosides-docusate sodium (SENOKOT-S) 8.6-50 MG tablet 1 tablet, 1 tablet, Oral, BID  tiZANidine (ZANAFLEX) tablet 4 mg, 4 mg, Oral, Q8H PRN  HYDROcodone-acetaminophen (NORCO) 5-325 MG per tablet 1 tablet, 1 tablet, Oral, Q4H PRN **OR** HYDROcodone-acetaminophen (NORCO) 5-325 MG per tablet 2 tablet, 2 tablet, Oral, Q4H PRN  insulin lispro (HUMALOG) injection vial 0-4 Units, 0-4 Units, SubCUTAneous, TID WC  insulin lispro (HUMALOG) injection vial 0-4 Units, 0-4 Units, SubCUTAneous, Nightly  glucose chewable tablet 16 g, 4 tablet, Oral, PRN  dextrose bolus 10% 125 mL, 125 mL, IntraVENous, PRN **OR** dextrose bolus 10% 250 mL, 250 mL, IntraVENous, PRN  glucagon (rDNA) injection 1 mg, 1 mg, SubCUTAneous, PRN  dextrose 10 % infusion, , IntraVENous, Continuous PRN    ASSESSMENT AND PLAN    65 y.o. male status post L3-5 laminectomy post op day #  1    1. PT- WBAT  2. Pain control  3. EPC  4. D/C plan for home today  5. Ortho Face-to-Face Discussion of Medical Necessity for Use of Assistive Device after Spine Surgery    I discussed today, face to face, the patient's mobility needs after their spine operation. The patient will require the use of a walker now and for at least 30 days continuously because of impaired gait and the necessity of joint support for safe ambulation after spine surgery. niiu is the option as cane will not satisfactorily provide stability after surgery. Jamaal John MD  [facility]  Spine Surgeon  [phone]

## 2023-02-16 NOTE — PLAN OF CARE
Problem: Discharge Planning  Goal: Discharge to home or other facility with appropriate resources  2/16/2023 0201 by Imani Glaser RN  Outcome: Progressing  Flowsheets (Taken 2/15/2023 1608 by Valerie Carmen RN)  Discharge to home or other facility with appropriate resources: Identify barriers to discharge with patient and caregiver     Problem: Pain  Goal: Verbalizes/displays adequate comfort level or baseline comfort level  2/16/2023 0201 by Imani Glaser RN  Outcome: Progressing     Problem: Safety - Adult  Goal: Free from fall injury  2/16/2023 0201 by Imani Glaser RN  Outcome: Progressing     Problem: ABCDS Injury Assessment  Goal: Absence of physical injury  Outcome: Progressing

## 2023-02-16 NOTE — PLAN OF CARE
Problem: Discharge Planning  Goal: Discharge to home or other facility with appropriate resources  2/16/2023 1117 by Diego Winters RN  Outcome: Progressing  Flowsheets (Taken 2/15/2023 1608)  Discharge to home or other facility with appropriate resources: Identify barriers to discharge with patient and caregiver  2/16/2023 0201 by Partha Black RN  Outcome: Progressing  Flowsheets (Taken 2/15/2023 1608 by Diego Winters RN)  Discharge to home or other facility with appropriate resources: Identify barriers to discharge with patient and caregiver     Problem: Pain  Goal: Verbalizes/displays adequate comfort level or baseline comfort level  2/16/2023 1117 by Diego Winters RN  Outcome: Progressing  Flowsheets (Taken 2/16/2023 1117)  Verbalizes/displays adequate comfort level or baseline comfort level:   Encourage patient to monitor pain and request assistance   Assess pain using appropriate pain scale  2/16/2023 0201 by Partha Black RN  Outcome: Progressing     Problem: Safety - Adult  Goal: Free from fall injury  2/16/2023 1117 by Diego Winters RN  Outcome: Progressing  Flowsheets (Taken 2/16/2023 1117)  Free From Fall Injury: Instruct family/caregiver on patient safety  2/16/2023 0201 by Partha Black RN  Outcome: Progressing     Problem: ABCDS Injury Assessment  Goal: Absence of physical injury  2/16/2023 1117 by Diego Winters RN  Outcome: Progressing  Flowsheets (Taken 2/16/2023 1117)  Absence of Physical Injury: Implement safety measures based on patient assessment  2/16/2023 0201 by Partha Black RN  Outcome: Progressing

## 2023-02-16 NOTE — PROGRESS NOTES
Physical Therapy  Facility/Department: [facility] SURG  Rehabilitation Physical Therapy Treatment Note    NAME: Uriel Ng  : 1945 (31 y.o.)  MRN: [id number]  CODE STATUS: Full Code    Date of Service: 23       Restrictions:  Restrictions/Precautions: General Precautions; Fall Risk;Surgical Protocols  Position Activity Restriction  Spinal Precautions: No Bending; No Lifting; No Twisting  Other position/activity restrictions: ambulate, activity as tolerated, telemetry, L hand IV, ALARMS     SUBJECTIVE  Subjective  Subjective: pt sitting edge of bed agreeable to PT              OBJECTIVE  Cognition  Overall Cognitive Status: Exceptions  Arousal/Alertness: Appropriate responses to stimuli  Following Commands: Follows all commands without difficulty  Attention Span: Appears intact  Memory: Appears intact  Safety Judgement: Decreased awareness of need for assistance;Decreased awareness of need for safety  Problem Solving: Decreased awareness of errors;Assistance required to identify errors made;Assistance required to correct errors made  Insights: Decreased awareness of deficits  Initiation: Requires cues for some  Sequencing: Does not require cues  Orientation  Overall Orientation Status: Within Functional Limits  Orientation Level: Oriented X4    Functional Mobility  Bed Mobility  Overall Assistance Level: Contact Guard Assist  Roll Right  Assistance Level: Contact guard assist  Scooting  Assistance Level: Contact guard assist  Transfers  Surface: From bed; To bed  Additional Factors: Verbal cues; Hand placement cues  Device: Walker  Sit to Stand  Assistance Level: Contact guard assist  Stand to Sit  Assistance Level: Stand by assist;Contact guard assist  Bed To/From Chair  Technique: Stand pivot  Assistance Level: Contact guard assist;Stand by assist  Stand Pivot  Assistance Level: Stand by assist;Contact guard assist      Environmental Mobility  Ambulation  Surface: Level surface  Device: Rolling walker  Distance: 250 ft x 2 pt required several verbal cues to slow down sobeida with ambulation   Activity: Within Room; Within Unit  Additional Factors: Verbal cues; Hand placement cues  Assistance Level: Contact guard assist  Gait Deviations: Decreased step length bilateral  Stairs  Stair Height: 6''  Device: Bilateral handrails  Number of Stairs: 2  Additional Factors: Verbal cues; Hand placement cues; Reciprocal going down;Reciprocal going up  Assistance Level: Contact guard assist    Pt educated on the importance of taking time and to not rush movements especially ambulation sobeida. PT Exercises  Exercise Treatment: seated LE AROM x 15 reps issued pt spinal packet with precautions, reviewed packet with pt. Circulation/Endurance Exercises: ankle pumps x 20      ASSESSMENT/PROGRESS TOWARDS GOALS       Assessment  Activity Tolerance: Patient limited by endurance  Discharge Recommendations: Patient would benefit from continued therapy after discharge  PT Equipment Recommendations  Walker: Rolling    Goals  Short Term Goals  Time Frame for Short Term Goals: 6 visits  Short Term Goal 1: Inc bed-mobility & transfers to independent to enable pt to safely get in/OOB(with spinal precautions) & chair to return to [facility] & decrease risk for falls  Short Term Goal 2:  Inc gait to amb 350ft or > indep w/ RW to enable pt to return to previous level of independence & able to demonstrate indep/ safe use of RW in functional activities including approaching surfaces and turning to sit,  & promotion of home walking program  Short Term Goal 3: Pt able to go up/down 2 steps with CLOSE supervision  Short Term Goal 4: Pt able to tolerate 30 min of activity to include ex, NMR & functional mobility with device to facilitate activity tolerance to [facility]  Short Term Goal 5: Ed spinal precautions, bed mobility within spinal precautions, posture, safety & fall prevention, prevention sedentary complications, Home walking program, & issue written Pt Ed    PLAN OF CARE/SAFETY  Physcial Therapy Plan  General Plan: 6-7 times per week  Current Treatment Recommendations: Strengthening;Balance training;Functional mobility training;Transfer training;ADL/Self-care training; Endurance training;Gait training;Stair training;Home exercise program;Safety education & training;Patient/Caregiver education & training;Positioning  Safety Devices  Type of Devices: All fall risk precautions in place; Bed alarm in place;Call light within reach;Gait belt; Heels elevated for pressure relief;Patient at risk for falls; Left in bed;Nurse notified    EDUCATION  Education  Education Given To: Patient  Education Provided: Plan of Care;IADL Function;Role of Therapy; ADL Function;Home Exercise Program;Mobility Training;Precautions;Transfer Training; Safety; Energy Conservation  Education Method: Verbal  Barriers to Learning: None  Education Outcome: Verbalized understanding    AM-PAC Score    AM-PAC Inpatient Mobility Raw Score : 21  AM-PAC Inpatient T-Scale Score : 50.25   Mobility Inpatient CMS 0-100% Score: 28.97  Mobility Inpatient CMS G-Code Modifier:CJ        Therapy Time   Individual Concurrent Group Co-treatment   Time In 0927         Time Out 1010         Minutes 43                   EMIR PERALTA PTA, 02/16/23 at 2:17 PM

## 2023-02-16 NOTE — PROGRESS NOTES
Pt discharged to home in good condition with belongings  Discharge instructions given  Pt denies having any further questions at this time  Personal items given to patient at discharge  Patient/family state they have everything they were admitted with. Dressing change supplies and hibiclens sent home with patient   Paper scripts given.

## 2023-02-16 NOTE — OP NOTE
Operative Note      Patient: Simone Black  YOB: 1945  MRN: [id number]    Date of Procedure: 2/15/2023    SURGEON:  Chary Mcpherson MD.     ANESTHESIA:  General endotracheal anesthesia. PREOPERATIVE DIAGNOSIS:  L3-5 lumbar spinal stenosis. POSTOPERATIVE DIAGNOSIS:  L3-5 lumbar spinal stenosis. PROCEDURE:  1. L3-4 laminectomy with partial medial facetectomies and      bilateral foraminotomies  2. L4-5laminectomy with partial medial facetectomies and      bilateral foraminotomies  3. Intraoperative use of C-arm fluoroscopy. ESTIMATED BLOOD LOSS:  200 mL. FLUIDS:  Per anesthesia record. COMPLICATIONS:  None. INDICATIONS:  This is a pleasant 65-year-old gentleman with a  history of severe bilateral leg pain with walking. He had MRI performed, which had severe stenosis   at L3-4 and L4-5 consistent with his symptoms. Due to the fact that he had failed all conservative management,  it was discussed with him the option of performing a laminectomy  and decompression at those levels. Risks were discussed  including bleeding, infection, injury to nerves, vessels,  anesthetic risk, the need for possible further future surgery, as  well as the possibility for continued pain, continued symptoms,  and possible extension of fusion. He did understand all these  risks and wished to proceed. Informed consent was obtained. DESCRIPTION OF PROCEDURE:  The patient was taken to the operating  room, kept supine on his bed. He was intubated, placed under  general anesthesia by the anesthesiologist.  He was given  preoperative antibiotic prophylaxis. He was then placed prone on  the Juan table. All bony prominences were padded. Eyes were  kept free of any pressure, brachial and elbows were kept free. The back was then prepped and draped in a sterile fashion.   A  spinal needle was used to localize the L4-5 level and C-arm was  brought in and this area was marked. The area was then  infiltrated with 0.5% Marcaine with epinephrine. Approximately,  a 4 to 5 cm incision extending down to the subcutaneous tissues. Once this was done and fully exposed from L3 to L5, Leksell was   used to further thin out the lamina and spinous processes   were removed at L3, partial L4 and the superior aspect of L5. This  was carefully dissected free with a sharp curette. The  laminectomy was then completed. Partial medial facetectomies  were then performed at L3-4  and L4-5 levels and bilateral  foraminotomies as well. Once the decompression was complete,  Markus Rodriguez was used to verify that the nerve roots were completely  freed from L3-5, which they were. The wound was then irrigated  with sterile saline and bacitracin. Vancomycin powder was  placed. Retractor was removed and closure was then performed  with 0 Vicryl, 2-0 Vicryl, and a running 4-0 Monocryl suture with  Dermabond glue. Standard dressings were then applied. He was  then placed supine on the bed, extubated, and taken to recovery  room in stable condition.     Electronically signed by Efe Lord MD on 2/15/2023 at 8:46 PM

## 2023-02-16 NOTE — CARE COORDINATION
Dr. Cezar Ham rounded. Script obtained for walker. Message left for Isa Torres with HCS and face sheet, script and face to face faxed. To be delivered to room.

## 2023-02-26 ENCOUNTER — HOSPITAL ENCOUNTER (INPATIENT)
Age: 78
LOS: 1 days | Discharge: HOME | DRG: 916 | End: 2023-02-27
Attending: EMERGENCY MEDICINE | Admitting: INTERNAL MEDICINE
Payer: MEDICARE

## 2023-02-26 DIAGNOSIS — R22.0 SWELLING OF UPPER LIP: ICD-10-CM

## 2023-02-26 DIAGNOSIS — T78.3XXA ANGIOEDEMA, INITIAL ENCOUNTER: Primary | ICD-10-CM

## 2023-02-26 PROBLEM — T46.4X5A ANGIOEDEMA DUE TO ANGIOTENSIN CONVERTING ENZYME INHIBITOR (ACE-I): Status: ACTIVE | Noted: 2023-02-26

## 2023-02-26 LAB
ABSOLUTE EOS #: 0.06 K/UL (ref 0–0.44)
ABSOLUTE IMMATURE GRANULOCYTE: 0.03 K/UL (ref 0–0.3)
ABSOLUTE LYMPH #: 2.51 K/UL (ref 1.1–3.7)
ABSOLUTE MONO #: 0.91 K/UL (ref 0.1–1.2)
ANION GAP SERPL CALCULATED.3IONS-SCNC: 13 MMOL/L (ref 9–17)
BASOPHILS # BLD: 1 % (ref 0–2)
BASOPHILS ABSOLUTE: 0.05 K/UL (ref 0–0.2)
BUN SERPL-MCNC: 13 MG/DL (ref 8–23)
BUN/CREAT BLD: 12 (ref 9–20)
CALCIUM SERPL-MCNC: 9.9 MG/DL (ref 8.6–10.4)
CHLORIDE SERPL-SCNC: 99 MMOL/L (ref 98–107)
CO2 SERPL-SCNC: 24 MMOL/L (ref 20–31)
CREAT SERPL-MCNC: 1.08 MG/DL (ref 0.7–1.2)
EOSINOPHILS RELATIVE PERCENT: 1 % (ref 1–4)
GFR SERPL CREATININE-BSD FRML MDRD: >60 ML/MIN/1.73M2
GLUCOSE SERPL-MCNC: 112 MG/DL (ref 70–99)
HCT VFR BLD AUTO: 41.9 % (ref 40.7–50.3)
HGB BLD-MCNC: 13.8 G/DL (ref 13–17)
IMMATURE GRANULOCYTES: 0 %
LYMPHOCYTES # BLD: 26 % (ref 24–43)
MCH RBC QN AUTO: 30.2 PG (ref 25.2–33.5)
MCHC RBC AUTO-ENTMCNC: 32.9 G/DL (ref 28.4–34.8)
MCV RBC AUTO: 91.7 FL (ref 82.6–102.9)
MONOCYTES # BLD: 10 % (ref 3–12)
NRBC AUTOMATED: 0 PER 100 WBC
PDW BLD-RTO: 12.4 % (ref 11.8–14.4)
PLATELET # BLD AUTO: 444 K/UL (ref 138–453)
PMV BLD AUTO: 9 FL (ref 8.1–13.5)
POTASSIUM SERPL-SCNC: 4.2 MMOL/L (ref 3.7–5.3)
RBC # BLD: 4.57 M/UL (ref 4.21–5.77)
SEG NEUTROPHILS: 62 % (ref 36–65)
SEGMENTED NEUTROPHILS ABSOLUTE COUNT: 5.94 K/UL (ref 1.5–8.1)
SODIUM SERPL-SCNC: 136 MMOL/L (ref 135–144)
WBC # BLD AUTO: 9.5 K/UL (ref 3.5–11.3)

## 2023-02-26 PROCEDURE — 96374 THER/PROPH/DIAG INJ IV PUSH: CPT

## 2023-02-26 PROCEDURE — 6360000002 HC RX W HCPCS: Performed by: NURSE PRACTITIONER

## 2023-02-26 PROCEDURE — 6360000002 HC RX W HCPCS: Performed by: EMERGENCY MEDICINE

## 2023-02-26 PROCEDURE — 99285 EMERGENCY DEPT VISIT HI MDM: CPT

## 2023-02-26 PROCEDURE — 80048 BASIC METABOLIC PNL TOTAL CA: CPT

## 2023-02-26 PROCEDURE — G0378 HOSPITAL OBSERVATION PER HR: HCPCS

## 2023-02-26 PROCEDURE — 83036 HEMOGLOBIN GLYCOSYLATED A1C: CPT

## 2023-02-26 PROCEDURE — 6370000000 HC RX 637 (ALT 250 FOR IP): Performed by: EMERGENCY MEDICINE

## 2023-02-26 PROCEDURE — 2580000003 HC RX 258: Performed by: NURSE PRACTITIONER

## 2023-02-26 PROCEDURE — 99222 1ST HOSP IP/OBS MODERATE 55: CPT | Performed by: NURSE PRACTITIONER

## 2023-02-26 PROCEDURE — 2000000000 HC ICU R&B

## 2023-02-26 PROCEDURE — 96372 THER/PROPH/DIAG INJ SC/IM: CPT

## 2023-02-26 PROCEDURE — 96375 TX/PRO/DX INJ NEW DRUG ADDON: CPT

## 2023-02-26 PROCEDURE — 85025 COMPLETE CBC W/AUTO DIFF WBC: CPT

## 2023-02-26 RX ORDER — ACETAMINOPHEN 650 MG/1
650 SUPPOSITORY RECTAL EVERY 6 HOURS PRN
Status: DISCONTINUED | OUTPATIENT
Start: 2023-02-26 | End: 2023-02-27 | Stop reason: HOSPADM

## 2023-02-26 RX ORDER — POTASSIUM CHLORIDE 7.45 MG/ML
10 INJECTION INTRAVENOUS PRN
Status: DISCONTINUED | OUTPATIENT
Start: 2023-02-26 | End: 2023-02-27 | Stop reason: HOSPADM

## 2023-02-26 RX ORDER — FAMOTIDINE 20 MG/1
20 TABLET, FILM COATED ORAL ONCE
Status: COMPLETED | OUTPATIENT
Start: 2023-02-26 | End: 2023-02-26

## 2023-02-26 RX ORDER — POLYETHYLENE GLYCOL 3350 17 G/17G
17 POWDER, FOR SOLUTION ORAL DAILY PRN
Status: DISCONTINUED | OUTPATIENT
Start: 2023-02-26 | End: 2023-02-27 | Stop reason: HOSPADM

## 2023-02-26 RX ORDER — ENOXAPARIN SODIUM 100 MG/ML
30 INJECTION SUBCUTANEOUS 2 TIMES DAILY
Status: DISCONTINUED | OUTPATIENT
Start: 2023-02-26 | End: 2023-02-27 | Stop reason: HOSPADM

## 2023-02-26 RX ORDER — DIPHENHYDRAMINE HYDROCHLORIDE 50 MG/ML
25 INJECTION INTRAMUSCULAR; INTRAVENOUS ONCE
Status: COMPLETED | OUTPATIENT
Start: 2023-02-26 | End: 2023-02-26

## 2023-02-26 RX ORDER — DIPHENHYDRAMINE HYDROCHLORIDE 50 MG/ML
25 INJECTION INTRAMUSCULAR; INTRAVENOUS EVERY 6 HOURS PRN
Status: DISCONTINUED | OUTPATIENT
Start: 2023-02-26 | End: 2023-02-27 | Stop reason: HOSPADM

## 2023-02-26 RX ORDER — ACETAMINOPHEN 325 MG/1
650 TABLET ORAL EVERY 6 HOURS PRN
Status: DISCONTINUED | OUTPATIENT
Start: 2023-02-26 | End: 2023-02-27 | Stop reason: HOSPADM

## 2023-02-26 RX ORDER — SODIUM CHLORIDE 9 MG/ML
INJECTION, SOLUTION INTRAVENOUS PRN
Status: DISCONTINUED | OUTPATIENT
Start: 2023-02-26 | End: 2023-02-27 | Stop reason: HOSPADM

## 2023-02-26 RX ORDER — DEXAMETHASONE SODIUM PHOSPHATE 4 MG/ML
4 INJECTION, SOLUTION INTRA-ARTICULAR; INTRALESIONAL; INTRAMUSCULAR; INTRAVENOUS; SOFT TISSUE EVERY 6 HOURS
Status: DISCONTINUED | OUTPATIENT
Start: 2023-02-27 | End: 2023-02-27 | Stop reason: HOSPADM

## 2023-02-26 RX ORDER — SODIUM CHLORIDE 9 MG/ML
INJECTION, SOLUTION INTRAVENOUS CONTINUOUS
Status: DISCONTINUED | OUTPATIENT
Start: 2023-02-26 | End: 2023-02-27 | Stop reason: HOSPADM

## 2023-02-26 RX ORDER — MAGNESIUM SULFATE 1 G/100ML
1000 INJECTION INTRAVENOUS PRN
Status: DISCONTINUED | OUTPATIENT
Start: 2023-02-26 | End: 2023-02-27 | Stop reason: HOSPADM

## 2023-02-26 RX ORDER — POTASSIUM CHLORIDE 20 MEQ/1
40 TABLET, EXTENDED RELEASE ORAL PRN
Status: DISCONTINUED | OUTPATIENT
Start: 2023-02-26 | End: 2023-02-27 | Stop reason: HOSPADM

## 2023-02-26 RX ORDER — SODIUM CHLORIDE 0.9 % (FLUSH) 0.9 %
5-40 SYRINGE (ML) INJECTION EVERY 12 HOURS SCHEDULED
Status: DISCONTINUED | OUTPATIENT
Start: 2023-02-26 | End: 2023-02-27 | Stop reason: HOSPADM

## 2023-02-26 RX ORDER — DEXAMETHASONE SODIUM PHOSPHATE 10 MG/ML
10 INJECTION, SOLUTION INTRAMUSCULAR; INTRAVENOUS ONCE
Status: COMPLETED | OUTPATIENT
Start: 2023-02-26 | End: 2023-02-26

## 2023-02-26 RX ORDER — ONDANSETRON 4 MG/1
4 TABLET, ORALLY DISINTEGRATING ORAL EVERY 8 HOURS PRN
Status: DISCONTINUED | OUTPATIENT
Start: 2023-02-26 | End: 2023-02-27 | Stop reason: HOSPADM

## 2023-02-26 RX ORDER — SODIUM CHLORIDE 0.9 % (FLUSH) 0.9 %
10 SYRINGE (ML) INJECTION PRN
Status: DISCONTINUED | OUTPATIENT
Start: 2023-02-26 | End: 2023-02-27 | Stop reason: HOSPADM

## 2023-02-26 RX ORDER — ONDANSETRON 2 MG/ML
4 INJECTION INTRAMUSCULAR; INTRAVENOUS EVERY 6 HOURS PRN
Status: DISCONTINUED | OUTPATIENT
Start: 2023-02-26 | End: 2023-02-27 | Stop reason: HOSPADM

## 2023-02-26 RX ADMIN — SODIUM CHLORIDE: 9 INJECTION, SOLUTION INTRAVENOUS at 23:15

## 2023-02-26 RX ADMIN — FAMOTIDINE 20 MG: 20 TABLET, FILM COATED ORAL at 21:01

## 2023-02-26 RX ADMIN — DEXAMETHASONE SODIUM PHOSPHATE 10 MG: 10 INJECTION, SOLUTION INTRAMUSCULAR; INTRAVENOUS at 21:01

## 2023-02-26 RX ADMIN — ENOXAPARIN SODIUM 30 MG: 100 INJECTION SUBCUTANEOUS at 23:16

## 2023-02-26 RX ADMIN — DIPHENHYDRAMINE HYDROCHLORIDE 25 MG: 50 INJECTION, SOLUTION INTRAMUSCULAR; INTRAVENOUS at 21:01

## 2023-02-26 RX ADMIN — SODIUM CHLORIDE, PRESERVATIVE FREE 10 ML: 5 INJECTION INTRAVENOUS at 23:12

## 2023-02-26 ASSESSMENT — PAIN DESCRIPTION - ORIENTATION
ORIENTATION: LOWER
ORIENTATION: LOWER

## 2023-02-26 ASSESSMENT — ENCOUNTER SYMPTOMS
SHORTNESS OF BREATH: 0
TROUBLE SWALLOWING: 0
COUGH: 0
ALLERGIC REACTION: 1
FACIAL SWELLING: 1
DIARRHEA: 0
COLOR CHANGE: 0
ABDOMINAL PAIN: 0
PHOTOPHOBIA: 0
VOMITING: 0
VOICE CHANGE: 0
NAUSEA: 0

## 2023-02-26 ASSESSMENT — PAIN DESCRIPTION - FREQUENCY
FREQUENCY: CONTINUOUS
FREQUENCY: CONTINUOUS

## 2023-02-26 ASSESSMENT — PAIN DESCRIPTION - PAIN TYPE
TYPE: CHRONIC PAIN
TYPE: CHRONIC PAIN

## 2023-02-26 ASSESSMENT — PAIN DESCRIPTION - DESCRIPTORS
DESCRIPTORS: ACHING;DISCOMFORT
DESCRIPTORS: ACHING;DISCOMFORT

## 2023-02-26 ASSESSMENT — PAIN - FUNCTIONAL ASSESSMENT
PAIN_FUNCTIONAL_ASSESSMENT: ACTIVITIES ARE NOT PREVENTED
PAIN_FUNCTIONAL_ASSESSMENT: ACTIVITIES ARE NOT PREVENTED

## 2023-02-26 ASSESSMENT — PAIN SCALES - GENERAL
PAINLEVEL_OUTOF10: 5
PAINLEVEL_OUTOF10: 5

## 2023-02-26 ASSESSMENT — PAIN DESCRIPTION - LOCATION
LOCATION: BACK
LOCATION: BACK

## 2023-02-26 ASSESSMENT — PAIN DESCRIPTION - ONSET
ONSET: ON-GOING
ONSET: ON-GOING

## 2023-02-26 NOTE — LETTER
[facility] Emergency Department      [address] [place] [address], [address] [place] [address] [phone]            PROOF OF PRESENCE      To Whom It May Concern:    Pennye Klinefelter was present in the Emergency Department at [facility] on 2/26/2023                                    Sincerely,        Sultana Durbin RN

## 2023-02-26 NOTE — LETTER
[address] [place] [address] Emergency Department      [address] [place] [address], [address] [place] [address] [phone]            PROOF OF PRESENCE      To Whom It May Concern:    Maryjo Foster was present in the Emergency Department at [address] [place] [address] on 2/26/2023.                                      Sincerely,        Salomón Arroyo RN

## 2023-02-26 NOTE — LETTER
[facility] Emergency Department      [address] [place] [address], [address] [place] [address] [phone]            PROOF OF PRESENCE      To Whom It May Concern:     was present in the Emergency Department at [facility] on ***.                                      Sincerely,        ***

## 2023-02-27 VITALS
DIASTOLIC BLOOD PRESSURE: 90 MMHG | HEART RATE: 107 BPM | TEMPERATURE: 98.3 F | RESPIRATION RATE: 19 BRPM | WEIGHT: 230.38 LBS | SYSTOLIC BLOOD PRESSURE: 130 MMHG | BODY MASS INDEX: 29.57 KG/M2 | OXYGEN SATURATION: 92 % | HEIGHT: 74 IN

## 2023-02-27 LAB
ANION GAP SERPL CALCULATED.3IONS-SCNC: 11 MMOL/L (ref 9–17)
BUN SERPL-MCNC: 14 MG/DL (ref 8–23)
BUN/CREAT BLD: 12 (ref 9–20)
CALCIUM SERPL-MCNC: 9.7 MG/DL (ref 8.6–10.4)
CHLORIDE SERPL-SCNC: 101 MMOL/L (ref 98–107)
CO2 SERPL-SCNC: 23 MMOL/L (ref 20–31)
CREAT SERPL-MCNC: 1.13 MG/DL (ref 0.7–1.2)
EST. AVERAGE GLUCOSE BLD GHB EST-MCNC: 137 MG/DL
GFR SERPL CREATININE-BSD FRML MDRD: >60 ML/MIN/1.73M2
GLUCOSE BLD-MCNC: 122 MG/DL (ref 75–110)
GLUCOSE SERPL-MCNC: 141 MG/DL (ref 70–99)
HBA1C MFR BLD: 6.4 % (ref 4–6)
INR PPP: 1.1
POTASSIUM SERPL-SCNC: 5.2 MMOL/L (ref 3.7–5.3)
PROTHROMBIN TIME: 14.5 SEC (ref 11.5–14.2)
SODIUM SERPL-SCNC: 135 MMOL/L (ref 135–144)

## 2023-02-27 PROCEDURE — 96372 THER/PROPH/DIAG INJ SC/IM: CPT

## 2023-02-27 PROCEDURE — 80048 BASIC METABOLIC PNL TOTAL CA: CPT

## 2023-02-27 PROCEDURE — 97530 THERAPEUTIC ACTIVITIES: CPT

## 2023-02-27 PROCEDURE — 96376 TX/PRO/DX INJ SAME DRUG ADON: CPT

## 2023-02-27 PROCEDURE — 85610 PROTHROMBIN TIME: CPT

## 2023-02-27 PROCEDURE — 6360000002 HC RX W HCPCS: Performed by: INTERNAL MEDICINE

## 2023-02-27 PROCEDURE — 97162 PT EVAL MOD COMPLEX 30 MIN: CPT

## 2023-02-27 PROCEDURE — 99238 HOSP IP/OBS DSCHRG MGMT 30/<: CPT | Performed by: INTERNAL MEDICINE

## 2023-02-27 PROCEDURE — 97166 OT EVAL MOD COMPLEX 45 MIN: CPT

## 2023-02-27 PROCEDURE — G0378 HOSPITAL OBSERVATION PER HR: HCPCS

## 2023-02-27 PROCEDURE — 82947 ASSAY GLUCOSE BLOOD QUANT: CPT

## 2023-02-27 PROCEDURE — 6360000002 HC RX W HCPCS: Performed by: NURSE PRACTITIONER

## 2023-02-27 PROCEDURE — 97110 THERAPEUTIC EXERCISES: CPT

## 2023-02-27 PROCEDURE — 97535 SELF CARE MNGMENT TRAINING: CPT

## 2023-02-27 PROCEDURE — 6370000000 HC RX 637 (ALT 250 FOR IP): Performed by: NURSE PRACTITIONER

## 2023-02-27 PROCEDURE — 36415 COLL VENOUS BLD VENIPUNCTURE: CPT

## 2023-02-27 RX ORDER — INSULIN LISPRO 100 [IU]/ML
0-4 INJECTION, SOLUTION INTRAVENOUS; SUBCUTANEOUS NIGHTLY
Status: DISCONTINUED | OUTPATIENT
Start: 2023-02-27 | End: 2023-02-27 | Stop reason: HOSPADM

## 2023-02-27 RX ORDER — PREDNISONE 20 MG/1
20 TABLET ORAL DAILY
Qty: 5 TABLET | Refills: 0 | Status: SHIPPED | OUTPATIENT
Start: 2023-02-27 | End: 2023-03-04

## 2023-02-27 RX ORDER — INSULIN LISPRO 100 [IU]/ML
0-4 INJECTION, SOLUTION INTRAVENOUS; SUBCUTANEOUS
Status: DISCONTINUED | OUTPATIENT
Start: 2023-02-27 | End: 2023-02-27 | Stop reason: HOSPADM

## 2023-02-27 RX ORDER — DEXTROSE MONOHYDRATE 100 MG/ML
INJECTION, SOLUTION INTRAVENOUS CONTINUOUS PRN
Status: DISCONTINUED | OUTPATIENT
Start: 2023-02-27 | End: 2023-02-27 | Stop reason: HOSPADM

## 2023-02-27 RX ORDER — HYDRALAZINE HYDROCHLORIDE 20 MG/ML
10 INJECTION INTRAMUSCULAR; INTRAVENOUS EVERY 6 HOURS PRN
Status: DISCONTINUED | OUTPATIENT
Start: 2023-02-27 | End: 2023-02-27 | Stop reason: HOSPADM

## 2023-02-27 RX ORDER — DOCUSATE SODIUM 100 MG/1
100 CAPSULE, LIQUID FILLED ORAL 3 TIMES DAILY
Status: DISCONTINUED | OUTPATIENT
Start: 2023-02-27 | End: 2023-02-27 | Stop reason: HOSPADM

## 2023-02-27 RX ORDER — METOPROLOL SUCCINATE 25 MG/1
25 TABLET, EXTENDED RELEASE ORAL DAILY
Qty: 30 TABLET | Refills: 3 | Status: SHIPPED | OUTPATIENT
Start: 2023-02-27

## 2023-02-27 RX ADMIN — DEXAMETHASONE SODIUM PHOSPHATE 4 MG: 4 INJECTION, SOLUTION INTRAMUSCULAR; INTRAVENOUS at 06:02

## 2023-02-27 RX ADMIN — DOCUSATE SODIUM 100 MG: 100 CAPSULE, LIQUID FILLED ORAL at 08:51

## 2023-02-27 RX ADMIN — DEXAMETHASONE SODIUM PHOSPHATE 4 MG: 4 INJECTION, SOLUTION INTRAMUSCULAR; INTRAVENOUS at 00:45

## 2023-02-27 RX ADMIN — ENOXAPARIN SODIUM 30 MG: 100 INJECTION SUBCUTANEOUS at 08:51

## 2023-02-27 ASSESSMENT — ENCOUNTER SYMPTOMS
CONSTIPATION: 1
VOMITING: 0
EYES NEGATIVE: 1
DIARRHEA: 0
ABDOMINAL PAIN: 0
ABDOMINAL DISTENTION: 0
RESPIRATORY NEGATIVE: 1

## 2023-02-27 ASSESSMENT — PAIN SCALES - GENERAL: PAINLEVEL_OUTOF10: 0

## 2023-02-27 NOTE — ED TRIAGE NOTES
Pt comes to the ED w/ c/o swollen top lip. Pt noticed swelling around noon and it progressed to his entire lip by 1400. Pt is taking lisinopril but this is not a new medication to him. He does report that this has happened before but it was always resolved with benadryl, this time it did not. Pt resting in bed w/ no s/s of distress.  Patient denies any needs at this time and has call light within reach, will continue to monitor

## 2023-02-27 NOTE — DISCHARGE SUMMARY
[facility]  Office: [address], DO, Berta Fernandes, DO, Simin Brain, DO, Anastacia Tang, DO, John Gamboa MD, Omid Hollis MD, Ema Duncan MD, Severiano Dorado MD,  Aura Garcia MD, Anupama Mota MD, Susana Fernandez, DO, Phuong Cheatham MD,  Jason Best DO, Rico Dumont MD, Fabiola Spain MD, Robbie Alaniz DO, Sherrie Neal MD, Jamila Whitfield MD, Staci Wiley, DO, Tita Santiago MD, Luciana French MD, Natasha Rice MD, Leida Jones MD, Je Gamboa DO, Hoda Molina MD, Radu Mcguire MD, Gerson Castle, CNP,  Oma Navarrete, CNP, Ksenia Singer, CNP, Fabiola Batista, CNP,  Khloe Chi, [facility], Jona Oswald, CNP, Melissa Bello, CNP, Hector Nice, CNP, Gilbert Flores, CNP, Liam Garcia, CNP, MICHAEL SahuC, Tera Juarez, CNS, Jack Forbes, CNP, Remigio Briones, [facility]    Discharge Summary     Patient ID: Eric Augustin  :  1945   MRN: [id number]     ACCOUNT:  [de-identified]   Patient's PCP: Kateryna Brennan MD  Admit Date: 2023   Discharge Date: 2023     Length of Stay: 1  Code Status:  Full Code  Admitting Physician: Tavo Tang DO  Discharge Physician: Tavo Tang DO     Active Discharge Diagnoses:     Hospital Problem Lists:  Principal Problem:    Angioedema due to angiotensin converting enzyme inhibitor (ACE-I)  Active Problems:    Class 1 obesity due to excess calories with serious comorbidity in adult    Primary hypertension    Hyperglycemia  Resolved Problems:    * No resolved hospital problems. *      Admission Condition:  fair     Discharged Condition: stable    Hospital Stay:     Hospital Course:   Eric Augustin is a 65 y.o. male who was admitted for the management of  Angioedema due to angiotensin converting enzyme inhibitor (ACE-I) , presented to ER with Medication Reaction (Pt states he started having a swollen lip around noon and is on lisinopril. )    Admitted with lip swelling from lisinopril. Placed in icu for close monitoring overnight. Lip swelling resolved in 24h on benadryl and steroids. Will go home on po steroids, off lisinopril and toprol will be initiated      Significant therapeutic interventions: see above    Significant Diagnostic Studies:   Labs / Micro:  CBC:   Lab Results   Component Value Date/Time    WBC 9.5 02/26/2023 09:00 PM    RBC 4.57 02/26/2023 09:00 PM    HGB 13.8 02/26/2023 09:00 PM    HCT 41.9 02/26/2023 09:00 PM    MCV 91.7 02/26/2023 09:00 PM    MCH 30.2 02/26/2023 09:00 PM    MCHC 32.9 02/26/2023 09:00 PM    RDW 12.4 02/26/2023 09:00 PM     02/26/2023 09:00 PM     CMP:    Lab Results   Component Value Date/Time    GLUCOSE 141 02/27/2023 04:40 AM     02/27/2023 04:40 AM    K 5.2 02/27/2023 04:40 AM     02/27/2023 04:40 AM    CO2 23 02/27/2023 04:40 AM    BUN 14 02/27/2023 04:40 AM    CREATININE 1.13 02/27/2023 04:40 AM    ANIONGAP 11 02/27/2023 04:40 AM    LABGLOM >60 02/27/2023 04:40 AM    GFRAA >60 08/17/2015 02:00 PM    GFR      08/17/2015 02:00 PM    GFR NOT REPORTED 08/17/2015 02:00 PM    CALCIUM 9.7 02/27/2023 04:40 AM        Radiology:  No results found. Consultations:    Consults:     Final Specialist Recommendations/Findings:   IP CONSULT TO CRITICAL CARE  IP CONSULT TO INTERNAL MEDICINE      The patient was seen and examined on day of discharge and this discharge summary is in conjunction with any daily progress note from day of discharge.     Discharge plan:     Disposition: Home    Physician Follow Up:     Phyllis Willingham MD  [facility]  [phone]    Follow up in 1 week(s)      Kelvin Pedersen MD  [address] [place] [address]  [phone]    Follow up in 1 day(s)         Requiring Further Evaluation/Follow Up POST HOSPITALIZATION/Incidental Findings: monitor bp on new med    Diet: regular diet    Activity: As tolerated    Instructions to Patient: take medications as prescribed      Discharge Medications:      Medication List        START taking these medications      metoprolol succinate 25 MG extended release tablet  Commonly known as: Toprol XL  Take 1 tablet by mouth daily     predniSONE 20 MG tablet  Commonly known as: DELTASONE  Take 1 tablet by mouth daily for 5 days            CONTINUE taking these medications      tiZANidine 4 MG tablet  Commonly known as: ZANAFLEX  Take 1 tablet by mouth every 8 hours as needed (spasm)            STOP taking these medications      lisinopril 20 MG tablet  Commonly known as: PRINIVIL;ZESTRIL     sennosides-docusate sodium 8.6-50 MG tablet  Commonly known as: SENOKOT-S               Where to Get Your Medications        These medications were sent to [facility] [address], [facility] [address] [address]      Phone: [phone]   metoprolol succinate 25 MG extended release tablet  predniSONE 20 MG tablet         No discharge procedures on file. Time Spent on discharge is  20 mins in patient examination, evaluation, counseling as well as medication reconciliation, prescriptions for required medications, discharge plan and follow up. Electronically signed by   Mihai Tang DO  2/27/2023  10:30 AM      Thank you Dr. Wilfrido Salamanca MD for the opportunity to be involved in this patient's care.

## 2023-02-27 NOTE — PROGRESS NOTES
Patient had uneventful night. Lip swelling reduced, only complaint is dried lips which gave Vaseline. No respiratory distress. Educated patient not to take remaining Lisinopril medication at home.

## 2023-02-27 NOTE — PROGRESS NOTES
[facility]  Office: [address], DO, Chung Solano DO, Martha Shay DO, Aye Tang, DO, Marcos Dominguez MD, Smitha Pierce MD, Santo Olsen MD, Mena Crowell MD,  Sony Gamboa MD, Deonte Macias MD, Lulu Dee DO, Kirsty Cordon MD,  Michell Barron MD, Shoaib Galvan MD, Court Hitchcock DO, Adam Tobin MD, Lu Lacey MD, Jenny Carter DO, Renea Sampson MD, Courtney Riedel, MD, Demetrius Morel MD, Judah Arriola MD, Meera Anderson DO, Marcella Abraham MD, Ruby Contreras MD, Jacky Robertson, Christy Felder, [facility], Allen Solis, CNP, Znaa Grant, CNP,  Carolyn Ireland, [facility], Lawrence Rodriguez, CNP, Sahara Huizar, CNP, Deandra Acosta, CNP, Gianna Day, CNP, Jessica Demarco, CNP, Rob Uribe PA-C, Vincent Barker, CNS, Mart Brown, CNP, Jacqualine Prime, [facility]    Progress Note    2/27/2023    10:28 AM    Name:   Shun Horner  MRN:     [id number]     Acct:      [de-identified]   Room:   [address] [place], [place] [address] Day:  1  Admit Date:  2/26/2023  8:51 PM    PCP:   Eusebia Hernandez MD  Code Status:  Full Code    Subjective:     C/C:   Chief Complaint   Patient presents with    Medication Reaction     Pt states he started having a swollen lip around noon and is on lisinopril. Interval History Status: improved. Feels fine  Denies cp/sob/n/v  Throat never got swollen and his lip swelling is gone    Brief History:     Per my SYLVIA:  Shravan Lu is a 65 y.o. Non- / non  male who presents with Medication Reaction (Pt states he started having a swollen lip around noon and is on lisinopril. )   and is admitted to the hospital for the management of Angioedema due to angiotensin converting enzyme inhibitor (ACE-I). Patient has a hx of HTN and says he has been taking lisinopril for years.  He has had a \"reaction\" to the lisinopril in 2016, but he says he took Benadryl and his reaction symptoms resolved, so he never told his PCP about it. Today he took his lisinopril around 1000, then between 0127-4097 he noticed that his whole upper lip was swollen. No swelling to his tongue or throat. He has a PMH of tobacco use (quit 10 days ago), lumbar back surgery (approx 10 days ago with Dr Edward Blancas), BPH, arthritis, cystic kidney disease, and renal mass, chronic tingling in his feet. \"    Review of Systems:     Constitutional:  negative for chills, fevers, sweats  Respiratory:  negative for cough, dyspnea on exertion, shortness of breath, wheezing  Cardiovascular:  negative for chest pain, chest pressure/discomfort, lower extremity edema, palpitations  Gastrointestinal:  negative for abdominal pain, constipation, diarrhea, nausea, vomiting  Neurological:  negative for dizziness, headache    Medications: Allergies:     Allergies   Allergen Reactions    Lisinopril Angioedema       Current Meds:   Scheduled Meds:    [START ON 2/28/2023] famotidine (PEPCID) injection  20 mg IntraVENous BID    docusate sodium  100 mg Oral TID    insulin lispro  0-4 Units SubCUTAneous TID WC    insulin lispro  0-4 Units SubCUTAneous Nightly    sodium chloride flush  5-40 mL IntraVENous 2 times per day    enoxaparin  30 mg SubCUTAneous BID    dexamethasone  4 mg IntraVENous Q6H     Continuous Infusions:    dextrose      sodium chloride      sodium chloride 75 mL/hr at 02/27/23 0616     PRN Meds: hydrALAZINE, glucose, dextrose bolus **OR** dextrose bolus, glucagon (rDNA), dextrose, diphenhydrAMINE, sodium chloride flush, sodium chloride, potassium chloride **OR** potassium alternative oral replacement **OR** potassium chloride, magnesium sulfate, ondansetron **OR** ondansetron, polyethylene glycol, acetaminophen **OR** acetaminophen    Data:     Past Medical History:   has a past medical history of Arthritis, BPH (benign prostatic hyperplasia), Cystic kidney disease, DDD (degenerative disc disease), lumbar, Hypertension, Renal mass, and Spondylolisthesis of lumbar region. Social History:   reports that he quit smoking 11 days ago. His smoking use included cigarettes. He started smoking about 63 years ago. He smoked an average of .5 packs per day. He has never used smokeless tobacco. He reports current alcohol use. He reports that he does not use drugs. Family History:   Family History   Problem Relation Age of Onset    Anxiety Disorder Mother     Cancer Father     Asthma Brother     Arthritis Brother        Vitals:  BP (!) 146/91   Pulse 77   Temp 98.3 °F (36.8 °C) (Oral)   Resp 17   Ht 6' 2\" (1.88 m)   Wt 230 lb 6.1 oz (104.5 kg)   SpO2 92%   BMI 29.58 kg/m²   Temp (24hrs), Av.3 °F (36.8 °C), Min:97.8 °F (36.6 °C), Max:98.7 °F (37.1 °C)    Recent Labs     23   POCGLU 122*       I/O (24Hr): Intake/Output Summary (Last 24 hours) at 2023 1028  Last data filed at 2023 0749  Gross per 24 hour   Intake 525.42 ml   Output 495 ml   Net 30.42 ml       Labs:  Hematology:  Recent Labs     23  2100 23  0440   WBC 9.5  --    RBC 4.57  --    HGB 13.8  --    HCT 41.9  --    MCV 91.7  --    MCH 30.2  --    MCHC 32.9  --    RDW 12.4  --      --    MPV 9.0  --    INR  --  1.1     Chemistry:  Recent Labs     23  2100 23  0440    135   K 4.2 5.2   CL 99 101   CO2 24 23   GLUCOSE 112* 141*   BUN 13 14   CREATININE 1.08 1.13   ANIONGAP 13 11   LABGLOM >60 >60   CALCIUM 9.9 9.7     Recent Labs     23  004   POCGLU 122*     ABG:No results found for: POCPH, PHART, PH, POCPCO2, YHW9FQQ, PCO2, POCPO2, PO2ART, PO2, POCHCO3, NFB7FSJ, HCO3, NBEA, PBEA, BEART, BE, THGBART, THB, UIX2HKM, CFGK1IUJ, N1QTQDWM, O2SAT, FIO2  Lab Results   Component Value Date/Time    SPECIAL NOT REPORTED 2012 02:21 PM     Lab Results   Component Value Date/Time    CULTURE NO SIGNIFICANT GROWTH 2023 11:30 AM       Radiology:  No results found.     Physical Examination: General appearance:  alert, cooperative and no distress  Mental Status:  oriented to person, place and time and normal affect  Lungs:  clear to auscultation bilaterally, normal effort  Heart:  regular rate and rhythm, no murmur  Abdomen:  soft, nontender, nondistended, normal bowel sounds, no masses, hepatomegaly, splenomegaly  Extremities:  no edema, redness, tenderness in the calves  Skin:  no gross lesions, rashes, induration    Assessment:        Hospital Problems             Last Modified POA    * (Principal) Angioedema due to angiotensin converting enzyme inhibitor (ACE-I) 2/27/2023 Yes    Class 1 obesity due to excess calories with serious comorbidity in adult 2/27/2023 Yes    Primary hypertension 2/27/2023 Yes    Hyperglycemia 2/27/2023 Yes       Plan:        Dc lisinopril  Switch to low dose toprol  Dc home f/u pcp    Nick Tang DO  2/27/2023  10:28 AM

## 2023-02-27 NOTE — ED NOTES
Writer attempted report, nurse needs 15 minutes to prepare     [employer], RN  02/26/23 2443

## 2023-02-27 NOTE — CARE COORDINATION
Case Management Assessment  Initial Evaluation    Date/Time of Evaluation: 2/27/2023 11:22 AM  Assessment Completed by: Johanna Zamora RN    If patient is discharged prior to next notation, then this note serves as note for discharge by case management. Patient Name: Batsheva Guardado                   YOB: 1945  Diagnosis: Swelling of upper lip [R22.0]  Angioedema, initial encounter Leonard Wasserman. 3XXA]  Angioedema due to angiotensin converting enzyme inhibitor (ACE-I) [T78. Brayan Burkett                   Date / Time: 2/26/2023  8:51 PM    Patient Admission Status: Inpatient   Readmission Risk (Low < 19, Mod (19-27), High > 27): Readmission Risk Score: 5.4    Current PCP: Dilma Powell MD  PCP verified by CM? Yes    Chart Reviewed: Yes      History Provided by: Patient  Patient Orientation: Alert and Oriented    Patient Cognition: Alert    Hospitalization in the last 30 days (Readmission):  Yes    If yes, Readmission Assessment in  Navigator will be completed. Advance Directives:      Code Status: Full Code   Patient's Primary Decision Maker is: Legal Next of Kin    Primary Decision MakerRuplynette Gibbs - Ex-Spouse - [phone]    Discharge Planning:    Patient lives with: Spouse/Significant Other Type of Home: House  Primary Care Giver: Self  Patient Support Systems include: Spouse/Significant Other, Children   Current Financial resources:    Current community resources:    Current services prior to admission: Durable Medical Equipment            Current DME: Shower Chair, Cane, Walker            Type of Home Care services:  PT, OT    ADLS  Prior functional level: Independent in ADLs/IADLs  Current functional level: Independent in ADLs/IADLs    PT AM-PAC:   /24  OT AM-PAC:   /24    Family can provide assistance at DC: Yes  Would you like Case Management to discuss the discharge plan with any other family members/significant others, and if so, who?  No  Plans to Return to Present Housing: Yes  Other Identified Issues/Barriers to RETURNING to current housing: none   Potential Assistance needed at discharge: N/A            Potential DME:    Patient expects to discharge to: [facility] for transportation at discharge:      Financial    Payor: Gali Rowland Roxi / Plan: [place] / Product Type: *No Product type* /     Does insurance require precert for SNF: Yes    Potential assistance Purchasing Medications: No  Meds-to-Beds request:        [facility] ([facility] ) - Delisa Ortiz 3 [phone] Jose Martínez [phone]  [facility] [address] [place] [address]  Phone: [phone] Fax: [phone]      Notes:    Factors facilitating achievement of predicted outcomes: Motivated, Cooperative, Pleasant, and Sense of humor    Barriers to discharge: none     Additional Case Management Notes:   PCP is Jasen Chun. Last seen beginning of FEB   DME RW, cane   Pharmacy RA on [facility]     Patient lives with ex spouse. Very independent. Drives. Does not use any dme currently. Admitted with angioedema. Likely discharge home today. No needs anticipated. The Plan for Transition of Care is related to the following treatment goals of Swelling of upper lip [R22.0]  Angioedema, initial encounter [T78. 3XXA]  Angioedema due to angiotensin converting enzyme inhibitor (ACE-I) [T78. 3XXA, K46.6D2X]    IF APPLICABLE: The Patient and/or patient representative Francine Marti and his family were provided with a choice of provider and agrees with the discharge plan. Freedom of choice list with basic dialogue that supports the patient's individualized plan of care/goals and shares the quality data associated with the providers was provided to:     Patient Representative Name:       The Patient and/or Patient Representative Agree with the Discharge Plan?       Cherry Perez RN  Case Management Department  Ph: [phone] Fax: [phone]

## 2023-02-27 NOTE — PROGRESS NOTES
Physical Therapy  Facility/Department: [facility]  Physical Therapy Initial Assessment    Name: Adam Jones  : 1945  MRN: [id number]  Date of Service: 2023  VICKIE Healy reports patient is medically stable for therapy treatment this date. Chart reviewed prior to treatment and patient is agreeable for therapy. All lines intact and patient positioned comfortably at end of treatment. All patient needs addressed prior to ending therapy session. Discharge Recommendations:  Patient would benefit from continued therapy after discharge        Per H&P:Rusty Limon is a 65 y.o. Non- / non  male who presents with Medication Reaction (Pt states he started having a swollen lip around noon and is on lisinopril. and is admitted to the hospital for the management of Angioedema due to angiotensin converting enzyme inhibitor (ACE-I). Patient has a hx of HTN and says he has been taking lisinopril for years. He has had a \"reaction\" to the lisinopril in 2016, but he says he took Benadryl and his reaction symptoms resolved, so he never told his PCP about it. Today he took his lisinopril around 1000, then between 2548-2290 he noticed that his whole upper lip was swollen. No swelling to his tongue or throat. He has a PMH of tobacco use (quit 10 days ago), lumbar back surgery (approx 10 days ago with Dr Teresa Ferguson), BPH, arthritis, cystic kidney disease, and renal mass, chronic tingling in his feet. Patient Diagnosis(es): The primary encounter diagnosis was Angioedema, initial encounter. A diagnosis of Swelling of upper lip was also pertinent to this visit. Past Medical History:  has a past medical history of Arthritis, BPH (benign prostatic hyperplasia), Cystic kidney disease, DDD (degenerative disc disease), lumbar, Hypertension, Renal mass, and Spondylolisthesis of lumbar region. Past Surgical History:  has a past surgical history that includes TURP (); Cystoscopy ();  Colonoscopy; and laminectomy (N/A, 2/15/2023). Assessment   Body Structures, Functions, Activity Limitations Requiring Skilled Therapeutic Intervention: Decreased functional mobility ; Decreased ADL status; Decreased balance;Decreased endurance;Decreased safe awareness;Decreased strength;Decreased high-level IADLs;Decreased posture  Assessment: Pt tolerated PT eval fair. Pt demonstrating fair steadiness throughout ambulation w/ RW. Pt presenting w/ mild deficits in strength, balance, gait, mobility, posture, and endurance. Pt would benefit from continued skilled PT to address deficits in order to maximize independence w/ functional mobility and return to OF as able.   Therapy Prognosis: Excellent  Decision Making: Medium Complexity  Requires PT Follow-Up: Yes  Activity Tolerance  Activity Tolerance: Patient limited by endurance  Activity Tolerance Comments: Pt's HR increased to 130bpm w/ ambulation within room however returned to low 100s quickly w/ rest.     Plan   Physcial Therapy Plan  General Plan: 5-7 times per week  Current Treatment Recommendations: Strengthening, Balance training, Functional mobility training, Transfer training, Neuromuscular re-education, Stair training, Gait training, Endurance training, Home exercise program, Equipment evaluation, education, & procurement, Patient/Caregiver education & training, Safety education & training, Therapeutic activities, Pain management  Safety Devices  Type of Devices: Call light within reach, Chair alarm in place, Left in chair, Gait belt, Nurse notified  Restraints  Restraints Initially in Place: No     Restrictions  Restrictions/Precautions  Restrictions/Precautions: General Precautions, Fall Risk  Required Braces or Orthoses?: No  Position Activity Restriction  Other position/activity restrictions: Up w/ assist, telemetry, RUE IV     Subjective   General  Patient assessed for rehabilitation services?: Yes  Response To Previous Treatment: Not applicable  Family / Caregiver Present: No  Follows Commands: Within Functional Limits  General Comment  Comments: RN and pt agreeable to therapy. Pt supine in bed upon arrival.  Pt pleasant and cooperative throughout. Subjective  Subjective: Pt reporting feeling \"okay\" at time of PT eval.         Social/Functional History  Social/Functional History  Lives With: Spouse (Ex spouse)  Type of Home: House  Home Layout: Multi-level  Home Access: Stairs to enter without rails  Entrance Stairs - Number of Steps: 2  Bathroom Shower/Tub: Tub/Shower unit  Bathroom Toilet: Standard  Bathroom Equipment: Grab bars in shower, Shower chair, Toilet raiser  Home Equipment: Walker, rolling, Cane  Has the patient had two or more falls in the past year or any fall with injury in the past year?: No  Receives Help From: Family (pt reports supportive ex spouse & adult children who are local)  ADL Assistance: Independent  Homemaking Assistance: Independent  Homemaking Responsibilities: Yes  Ambulation Assistance: Independent (uses cane at baseline; since cindy miller/ Dr. Ramila Costa on 2/15/23 has been using his RW)  Transfer Assistance: Independent  Active : Yes  Mode of Transportation: Car  Occupation: Retired  Type of Occupation: [employer]  Leisure & Hobbies: gym, housework, yardwork  Vision/Hearing  Vision  Vision: Impaired  Vision Exceptions: Wears glasses at all times  Hearing  Hearing: Within functional limits    Cognition   Orientation  Overall Orientation Status: Within Functional Limits  Cognition  Overall Cognitive Status: Exceptions  Arousal/Alertness: Appropriate responses to stimuli  Following Commands:  Follows multistep commands with repitition  Attention Span: Appears intact  Memory: Appears intact  Safety Judgement: Decreased awareness of need for assistance;Decreased awareness of need for safety  Problem Solving: Assistance required to correct errors made;Assistance required to generate solutions;Decreased awareness of errors  Insights: Decreased awareness of deficits  Initiation: Does not require cues  Sequencing: Requires cues for some     Objective   Observation/Palpation  Posture: Fair  Gross Assessment  Sensation: Impaired (Pt reports chronic numbness/tingling in B hands & toes)     AROM RLE (degrees)  RLE AROM: WFL  AROM LLE (degrees)  LLE AROM : WFL  AROM RUE (degrees)  RUE AROM : WFL  AROM LUE (degrees)  LUE AROM : WFL  Strength RLE  Strength RLE: WFL  Comment: Grossly 4/5  Strength LLE  Strength LLE: WFL  Comment: Grossly 4/5  Strength RUE  Comment: See OT assessment for detail  Strength LUE  Comment: See OT assessment for detail          Bed mobility  Rolling to Right: Contact guard assistance  Supine to Sit: Minimal assistance  Sit to Supine:  (Not assessed this date. Pt retired in bedside chair at end of session.)  Scooting: Contact guard assistance  Bed Mobility Comments: Pt w/o significant difficulty throughout bed mobility this date. Pt requiring mod verbal cueing for proper log-roll technique w/ fair return demo. Pt requiring increased time and effort to perform tasks throughout. Pt reporting mild lightheadedness upon sitting at EOB which subsided quickly. Assist required for safe line mgmt throughout. Transfers  Sit to Stand: Contact guard assistance  Stand to Sit: Contact guard assistance  Comment: Pt performed 2 STS transfers throughout session this date demonstrating fair steadiness throughout. Pt requiring mod verbal and tactile cueing for proper hand placement throughout transfers w/ RW w/ fair return demo. Pt denying any dizziness/lightheadedness throughout position changes. Pt also demonstrating fair eccentric quad control throughout stand>sit transfers w/ mod verbal cueing.     Ambulation  Surface: Level tile  Device: Rolling Walker  Assistance: Contact guard assistance  Quality of Gait: fair steadiness, slow pace, mild shuffling  Gait Deviations: Slow [place];Decreased step length;Decreased step height;Shuffles;Decreased head and trunk rotation  Distance: 30ft + 15ft  Comments: Pt demonstrating fair steadiness throughout ambulation within room this date. Pt requiring min verbal cueing to maintain NELLIE within RW w/ fair return demo. Pt also requiring min verbal cueing for heelstrike bilaterally w/ poor return demo. Assist required for safe line mgmt throughout. More Ambulation?: No  Stairs/Curb  Stairs?: No       Balance  Posture: Fair  Sitting - Static: Good  Sitting - Dynamic: Good;-  Standing - Static: Fair;+  Standing - Dynamic: Fair;-  Single Leg Stance R Le  Single Leg Stance L Le  Comments: Standing balance assessed w/ RW  Exercise Treatment: ankle pumps, riley, LAMONTSERRAT        AM-PAC Score  AM-PAC Inpatient Mobility Raw Score : 19 (23)  AM-PAC Inpatient T-Scale Score : 45.44 (23)  Mobility Inpatient CMS 0-100% Score: 41.77 (23)  Mobility Inpatient CMS G-Code Modifier : CK (23)          Functional Outcome Measure-   Single Leg Stance Test:  0 sec. (<5 sec.= fall risk)      Goals  Short Term Goals  Time Frame for Short Term Goals: 12 visits  Short Term Goal 1: Pt to demonstrate bed mobility independently  Short Term Goal 2: Pt to perform STS transfers w/ RW independently  Short Term Goal 3: Pt to ambulate at least 100ft w/ RW independently  Short Term Goal 4: Pt to ascend/descend 8 stairs w/ handrails SBA  Short Term Goal 5: Pt to actively participate in at least 30 minutes of physical therapy for ther act, ther ex, balance, gait, and endurance training  Patient Goals   Patient Goals : To go home       Education  Patient Education  Education Given To: Patient  Education Provided: Role of Therapy;Plan of Care;Precautions; Energy Conservation;Transfer Training;Equipment; Fall Prevention Strategies  Education Provided Comments: Pt educated on: purpose of acute PT eval, importance of continued mobility throughout admission & upon discharge, general safety awareness, fall risk prevention, proper log-roll technique, safe transfers & ambulation w/ RW, pursed lip breathing, and PT POC. Pt w/ fair return demo.   Education Method: Verbal  Education Outcome: Verbalized understanding      Therapy Time   Individual Concurrent Group Co-treatment   Time In 2426         Time Out 3907         Minutes 36         Treatment time: 23 minutes       Homero Rinaldi, PT

## 2023-02-27 NOTE — ED PROVIDER NOTES
EMERGENCY DEPARTMENT ENCOUNTER    Pt Name: Dhaval Holbrook  MRN: [id number]  [place] 1945  Date of evaluation: 2/26/23  CHIEF COMPLAINT       Chief Complaint   Patient presents with    Medication Reaction     Pt states he started having a swollen lip around noon and is on lisinopril. HISTORY OF PRESENT ILLNESS   71-year-old male presenting to the ER complaining of upper lip swelling that started at noon today. Patient does admit to an underlying history of hypertension for which she does utilize lisinopril. Patient states that started at noon by 1 or 2:00 his whole upper lip was swollen. Patient denies any changes to his tongue or posterior pharynx. Patient denies any shortness of breath and states the only thing that he is feeling right now is a swollen upper lip. Patient states it has happened a couple times prior but he was not assessed by a physician when it happened. The history is provided by the patient. Allergic Reaction  Presenting symptoms: swelling (upper lip)    Presenting symptoms: no difficulty swallowing and no rash    Severity:  Moderate  Duration:  9 hours  Prior episodes: has happened prior. Context: medications (lisonipril)          REVIEW OF SYSTEMS     Review of Systems   Constitutional:  Negative for activity change, fatigue and fever. HENT:  Positive for facial swelling (upper lip). Negative for congestion, ear pain, trouble swallowing and voice change. Eyes:  Negative for photophobia and visual disturbance. Respiratory:  Negative for cough and shortness of breath. Cardiovascular:  Negative for chest pain and palpitations. Gastrointestinal:  Negative for abdominal pain, diarrhea, nausea and vomiting. Genitourinary:  Negative for dysuria, flank pain and urgency. Musculoskeletal:  Negative for arthralgias and myalgias. Skin:  Negative for color change and rash. Neurological:  Negative for dizziness and facial asymmetry.    Psychiatric/Behavioral:  Negative for agitation and behavioral problems. PASTMEDICAL HISTORY     Past Medical History:   Diagnosis Date    Arthritis     BPH (benign prostatic hyperplasia)     Cystic kidney disease     DDD (degenerative disc disease), lumbar     Hypertension     Renal mass     Spondylolisthesis of lumbar region      Past Problem List  Patient Active Problem List   Diagnosis Code    Lumbar stenosis with neurogenic claudication M48.062    Class 1 obesity due to excess calories with serious comorbidity in adult E66.09    Primary hypertension I10    Hyperglycemia R73.9    Tobacco abuse Z72.0    Angioedema due to angiotensin converting enzyme inhibitor (ACE-I) T78. 3XXA, [id number]     SURGICAL HISTORY       Past Surgical History:   Procedure Laterality Date    COLONOSCOPY      CYSTOSCOPY  2012    LAMINECTOMY N/A 2/15/2023    L 3-5 LUMBAR LAMINECTOMY POSTERIOR  - GABRIELA performed by Alejandro Calderon MD at [address] [place]  2006     CURRENT MEDICATIONS       Previous Medications    SENNOSIDES-DOCUSATE SODIUM (SENOKOT-S) 8.6-50 MG TABLET    Take 1 tablet by mouth 2 times daily    TIZANIDINE (ZANAFLEX) 4 MG TABLET    Take 1 tablet by mouth every 8 hours as needed (spasm)     ALLERGIES     has No Known Allergies. FAMILY HISTORY     has no family status information on file. SOCIAL HISTORY       Social History     Tobacco Use    Smoking status: Every Day     Packs/day: 0.50     Types: Cigarettes     Start date: 55    Smokeless tobacco: Never   Substance Use Topics    Alcohol use: Yes     Comment: rarely     PHYSICAL EXAM     INITIAL VITALS: BP (!) 171/101   Pulse (!) 108   Temp 97.8 °F (36.6 °C) (Oral)   Resp 18   Ht 6' 2\" (1.88 m)   Wt 250 lb (113.4 kg)   SpO2 94%   BMI 32.10 kg/m²    Physical Exam  Constitutional:       General: He is not in acute distress. Appearance: Normal appearance. HENT:      Head: Normocephalic and atraumatic.       Right Ear: External ear normal.      Left Ear: External ear normal.      Nose: Nose normal. No congestion or rhinorrhea. Mouth/Throat:      Comments: Upper lip swelling  Eyes:      Extraocular Movements: Extraocular movements intact. Pupils: Pupils are equal, round, and reactive to light. Cardiovascular:      Rate and Rhythm: Normal rate and regular rhythm. Pulses: Normal pulses. Heart sounds: Normal heart sounds. Pulmonary:      Effort: Pulmonary effort is normal. No respiratory distress. Breath sounds: Normal breath sounds. Abdominal:      General: Bowel sounds are normal.      Palpations: Abdomen is soft. Musculoskeletal:         General: No swelling or deformity. Normal range of motion. Cervical back: Normal range of motion. No rigidity. Skin:     General: Skin is warm and dry. Neurological:      General: No focal deficit present. Mental Status: He is alert and oriented to person, place, and time. Mental status is at baseline. Psychiatric:         Mood and Affect: Mood normal.         Behavior: Behavior normal.       MEDICAL DECISION MAKING / ED COURSE:         1)  Number and Complexity of Problems Addressed at this Encounter  Problem List This Visit: Upper lip swelling    Differential Diagnosis: Allergic reaction, angioedema    Pertinent Comorbid Conditions: Hypertension treated with lisinopril    2)  Data Reviewed and Analyzed  (Lab and radiology tests/orders below in next section)    Lab work in the ER did not display signs of acute abnormality    3)  Treatment and Disposition       Patient provided with a dose of Pepcid as well as steroids and Benadryl in the ER without any change to the swelling in the upper lip. The patient's swelling in the upper lip has been the same since 2 PM earlier today. A consult to critical care was had in the ER and the intensivist agreed with observation overnight in the ICU. The patient was admitted after speaking with the admitting team.  Patient understands and agrees with the plan.     CRITICAL CARE:       PROCEDURES:    Procedures      DATA FOR LAB AND RADIOLOGY TESTS ORDERED BELOW ARE REVIEWED BY THE ED CLINICIAN:    RADIOLOGY: All x-rays, CT, MRI, and formal ultrasound images (except ED bedside ultrasound) are read by the radiologist, see reports below, unless otherwise noted in MDM or here.  Reports below are reviewed by myself.  No orders to display       LABS: Lab orders shown below, the results are reviewed by myself, and all abnormals are listed below.  Labs Reviewed   BASIC METABOLIC PANEL - Abnormal; Notable for the following components:       Result Value    Glucose 112 (*)     All other components within normal limits   CBC WITH AUTO DIFFERENTIAL       Vitals Reviewed:    Vitals:    02/26/23 2047   BP: (!) 171/101   Pulse: (!) 108   Resp: 18   Temp: 97.8 °F (36.6 °C)   TempSrc: Oral   SpO2: 94%   Weight: 250 lb (113.4 kg)   Height: 6' 2\" (1.88 m)     MEDICATIONS GIVEN TO PATIENT THIS ENCOUNTER:  Orders Placed This Encounter   Medications    diphenhydrAMINE (BENADRYL) injection 25 mg    dexamethasone (PF) (DECADRON) injection 10 mg    famotidine (PEPCID) tablet 20 mg     DISCHARGE PRESCRIPTIONS:  New Prescriptions    No medications on file     PHYSICIAN CONSULTS ORDERED THIS ENCOUNTER:  IP CONSULT TO CRITICAL CARE  IP CONSULT TO INTERNAL MEDICINE  FINAL IMPRESSION      1. Angioedema, initial encounter    2. Swelling of upper lip          DISPOSITION/PLAN   DISPOSITION Admitted 02/26/2023 09:39:24 PM      OUTPATIENT FOLLOW UP THE PATIENT:  No follow-up provider specified.    DO Benedict Rodríguez DO  02/26/23 2818

## 2023-02-27 NOTE — PROGRESS NOTES
Patient came to the ICU, in no respiratory distress with top lip swelling. [place] patient to room, answered patient and family questions. Contacted Critical care d/t In house NP wanting to know if decadron would be scheduled.     Given orders for 4mg IV Q6hr

## 2023-02-27 NOTE — ACP (ADVANCE CARE PLANNING)
Advance Care Planning     Advance Care Planning Activator (Inpatient)  Conversation Note      Date of ACP Conversation: 2/27/2023     Conversation Conducted with: Patient with Decision Making Capacity    ACP Activator: Maria Elena Duggan RN        Health Care Decision Maker:     Current Designated Health Care Decision Maker:     Primary Decision Maker: Aleksandra Hdez - Ex-Spouse - [phone]  Click here to complete Healthcare Decision Makers including section of the Healthcare Decision Maker Relationship (ie \"Primary\")  Today we documented Decision Maker(s) consistent with Legal Next of Kin hierarchy. Care Preferences    Ventilation: \"If you were in your present state of health and suddenly became very ill and were unable to breathe on your own, what would your preference be about the use of a ventilator (breathing machine) if it were available to you? \"      Would the patient desire the use of ventilator (breathing machine)?: yes    \"If your health worsens and it becomes clear that your chance of recovery is unlikely, what would your preference be about the use of a ventilator (breathing machine) if it were available to you? \"     Would the patient desire the use of ventilator (breathing machine)?: No      Resuscitation  \"CPR works best to restart the heart when there is a sudden event, like a heart attack, in someone who is otherwise healthy. Unfortunately, CPR does not typically restart the heart for people who have serious health conditions or who are very sick. \"    \"In the event your heart stopped as a result of an underlying serious health condition, would you want attempts to be made to restart your heart (answer \"yes\" for attempt to resuscitate) or would you prefer a natural death (answer \"no\" for do not attempt to resuscitate)? \" yes       [x] Yes   [] No   Educated Patient / Goyo Chris regarding differences between Advance Directives and portable DNR orders.     Length of ACP Conversation in minutes: Conversation Outcomes:  ACP discussion completed    Follow-up plan:    [] Schedule follow-up conversation to continue planning  [] Referred individual to Provider for additional questions/concerns   [] Advised patient/agent/surrogate to review completed ACP document and update if needed with changes in condition, patient preferences or care setting    [] This note routed to one or more involved healthcare providers

## 2023-02-27 NOTE — H&P
[facility]  Office: [address], , Doris Lance, DO, Joellen Garcia, DO, Luan Tang, DO, Wendie Mullen MD, Solange Buck MD, Janki Pompa MD, Lindsey Deshpande MD,  Bishop Weeks MD, Nakia Le MD, Phil Hendricks, DO, Lani Loyola MD,  Christi Silver MD, Rell Fisher MD, Bryanna Arguello DO, Cely Cheng MD, Renee Smith MD, Dustin Sauer DO, Wally Prakash MD, Lidia Caceres MD, Jaylen Herrera MD, Cristo Barillas MD, Guadalupe Khan DO, Latesha Jay MD, Micheline Cox MD, Thea Perez, CNP,  Shade Frances, CNP, Reinaldo Alford, CNP, Laurence Calderon, CNP,  Paula Miles, [facility], Amarjit Ventura, CNP, Tammy Galicia, CNP, Devan Riley, CNP, Bolivar Frederick, CNP, Salomón Mcmahan, CNP, Miguel A Wiggins PA-C, Livier Servin, CNS, Juan F Yepez, CNP, Markel Godinez, [facility]    HISTORY AND PHYSICAL EXAMINATION            Date:   2/27/2023  Patient name:  Sima Nesbitt  Date of admission:  2/26/2023  8:51 PM  MRN:   [id number]  Account:  [de-identified]  YOB: 1945  PCP:    Wilfrido Salamanca MD  Room:   1969/4405-24  Code Status:    Full Code    Chief Complaint:     Chief Complaint   Patient presents with    Medication Reaction     Pt states he started having a swollen lip around noon and is on lisinopril. History Obtained From:     patient    History of Present Illness:     Sima Nesbitt is a 65 y.o. Non- / non  male who presents with Medication Reaction (Pt states he started having a swollen lip around noon and is on lisinopril. )   and is admitted to the hospital for the management of Angioedema due to angiotensin converting enzyme inhibitor (ACE-I). Patient has a hx of HTN and says he has been taking lisinopril for years.  He has had a \"reaction\" to the lisinopril in 2016, but he says he took Benadryl and his reaction symptoms resolved, so he never told his PCP about it. Today he took his lisinopril around 1000, then between 9243-6057 he noticed that his whole upper lip was swollen. No swelling to his tongue or throat. He has a PMH of tobacco use (quit 10 days ago), lumbar back surgery (approx 10 days ago with Dr Cornelius Laughter), BPH, arthritis, cystic kidney disease, and renal mass, chronic tingling in his feet. While in the ED, he was given Pepcid PO, IV Decadron, and IV Benadryl. He was admitted to the ICU for further monitoring and management of angioedema. Past Medical History:     Past Medical History:   Diagnosis Date    Arthritis     BPH (benign prostatic hyperplasia)     Cystic kidney disease     DDD (degenerative disc disease), lumbar     Hypertension     Renal mass     Spondylolisthesis of lumbar region         Past Surgical History:     Past Surgical History:   Procedure Laterality Date    COLONOSCOPY      CYSTOSCOPY  2012    LAMINECTOMY N/A 2/15/2023    L 3-5 LUMBAR LAMINECTOMY POSTERIOR  - GABRIELA performed by Giles Queen MD at [address] [place] [address]  2006        Medications Prior to Admission:     Prior to Admission medications    Medication Sig Start Date End Date Taking? Authorizing Provider   sennosides-docusate sodium (SENOKOT-S) 8.6-50 MG tablet Take 1 tablet by mouth 2 times daily  Patient not taking: Reported on 2/26/2023 2/16/23   Giles Queen MD   tiZANidine (ZANAFLEX) 4 MG tablet Take 1 tablet by mouth every 8 hours as needed (spasm) 2/16/23   Giles Queen MD        Allergies:     Lisinopril    Social History:     Tobacco:    reports that he quit smoking 11 days ago. His smoking use included cigarettes. He started smoking about 63 years ago. He smoked an average of .5 packs per day. He has never used smokeless tobacco.  Alcohol:      reports current alcohol use. Drug Use:  reports no history of drug use.     Family History:     Family History   Problem Relation Age of Onset    Anxiety Disorder Mother     Cancer Father     Asthma Brother Arthritis Brother        Review of Systems:     Positive and Negative as described in HPI. Review of Systems   Constitutional: Negative. HENT:          Upper lip swelling   Eyes: Negative. Respiratory: Negative. Cardiovascular: Negative. Gastrointestinal:  Positive for constipation. Negative for abdominal distention, abdominal pain, diarrhea and vomiting. Genitourinary: Negative. Musculoskeletal: Negative. Skin: Negative. Neurological: Negative. Psychiatric/Behavioral: Negative. Physical Exam:   /74   Pulse 74   Temp 98.2 °F (36.8 °C) (Oral)   Resp 17   Ht 6' 2\" (1.88 m)   Wt 250 lb (113.4 kg)   SpO2 94%   BMI 32.10 kg/m²   Temp (24hrs), Av °F (36.7 °C), Min:97.8 °F (36.6 °C), Max:98.2 °F (36.8 °C)    No results for input(s): POCGLU in the last 72 hours. No intake or output data in the 24 hours ending 23 0016    Physical Exam  Vitals and nursing note reviewed. Constitutional:       General: He is not in acute distress. Appearance: He is not ill-appearing, toxic-appearing or diaphoretic. HENT:      Head: Normocephalic and atraumatic. Right Ear: External ear normal.      Left Ear: External ear normal.      Nose: Nose normal. No rhinorrhea. Mouth/Throat:      Mouth: Mucous membranes are moist. Angioedema present. Comments: Upper lip swollen  Eyes:      General: No scleral icterus. Right eye: No discharge. Left eye: No discharge. Extraocular Movements: Extraocular movements intact. Conjunctiva/sclera: Conjunctivae normal.      Pupils: Pupils are equal, round, and reactive to light. Cardiovascular:      Rate and Rhythm: Normal rate and regular rhythm. Pulses: Normal pulses. Heart sounds: Normal heart sounds. No murmur heard. No friction rub. No gallop. Pulmonary:      Effort: Pulmonary effort is normal. No respiratory distress. Breath sounds: Normal breath sounds.  No wheezing, rhonchi or rales.   Abdominal:      General: There is distension. Palpations: Abdomen is soft. Tenderness: There is no abdominal tenderness. There is no guarding. Hernia: No hernia is present. Comments: Hyperactive bowel sounds   Musculoskeletal:         General: Normal range of motion. Cervical back: Normal range of motion and neck supple. Right lower leg: No edema. Left lower leg: No edema. Skin:     General: Skin is warm. Coloration: Skin is not jaundiced. Findings: No bruising, erythema or lesion. Neurological:      General: No focal deficit present. Mental Status: He is alert and oriented to person, place, and time. Psychiatric:         Mood and Affect: Mood normal.         Behavior: Behavior normal.         Thought Content:  Thought content normal.         Judgment: Judgment normal.       Investigations:      Laboratory Testing:  Recent Results (from the past 24 hour(s))   CBC with Auto Differential    Collection Time: 02/26/23  9:00 PM   Result Value Ref Range    WBC 9.5 3.5 - 11.3 k/uL    RBC 4.57 4.21 - 5.77 m/uL    Hemoglobin 13.8 13.0 - 17.0 g/dL    Hematocrit 41.9 40.7 - 50.3 %    MCV 91.7 82.6 - 102.9 fL    MCH 30.2 25.2 - 33.5 pg    MCHC 32.9 28.4 - 34.8 g/dL    RDW 12.4 11.8 - 14.4 %    Platelets 953 666 - 095 k/uL    MPV 9.0 8.1 - 13.5 fL    NRBC Automated 0.0 0.0 per 100 WBC    Seg Neutrophils 62 36 - 65 %    Lymphocytes 26 24 - 43 %    Monocytes 10 3 - 12 %    Eosinophils % 1 1 - 4 %    Basophils 1 0 - 2 %    Immature Granulocytes 0 0 %    Segs Absolute 5.94 1.50 - 8.10 k/uL    Absolute Lymph # 2.51 1.10 - 3.70 k/uL    Absolute Mono # 0.91 0.10 - 1.20 k/uL    Absolute Eos # 0.06 0.00 - 0.44 k/uL    Basophils Absolute 0.05 0.00 - 0.20 k/uL    Absolute Immature Granulocyte 0.03 0.00 - 0.30 k/uL   Basic Metabolic Panel    Collection Time: 02/26/23  9:00 PM   Result Value Ref Range    Glucose 112 (H) 70 - 99 mg/dL    BUN 13 8 - 23 mg/dL    Creatinine 1.08 0.70 - 1.20 mg/dL    Est, Glom Filt Rate >60 >60 mL/min/1.73m2    Bun/Cre Ratio 12 9 - 20    Calcium 9.9 8.6 - 10.4 mg/dL    Sodium 136 135 - 144 mmol/L    Potassium 4.2 3.7 - 5.3 mmol/L    Chloride 99 98 - 107 mmol/L    CO2 24 20 - 31 mmol/L    Anion Gap 13 9 - 17 mmol/L       Imaging/Diagnostics:  No results found. Assessment :      Hospital Problems             Last Modified POA    * (Principal) Angioedema due to angiotensin converting enzyme inhibitor (ACE-I) 2/27/2023 Yes    Primary hypertension 2/27/2023 Yes    Class 1 obesity due to excess calories with serious comorbidity in adult 2/27/2023 Yes    Hyperglycemia 2/27/2023 Yes       Plan:     Patient status inpatient in the  Medical ICU    Angioedema: IV decadron, IV Pepcid. IV hydration. Crit care pulm consult. HTN: Monitor VS as ordered. Hydralazine IV prn for SBP > 150. Obesity: weight loss management as OP per PCP  Hyperglycemia: Carb controlled diet when appropriate. BMP in AM.  POCT AC & HS, low correction SSI. Check a1c    Consultations:   IP CONSULT TO CRITICAL CARE  IP CONSULT TO INTERNAL MEDICINE     Patient is admitted as inpatient status because of co-morbidities listed above, severity of signs and symptoms as outlined, requirement for current medical therapies and most importantly because of direct risk to patient if care not provided in a hospital setting. Expected length of stay > 48 hours.     SAMEERA Meng NP  2/27/2023  12:16 AM    Copy sent to Dr. Valeri Medina MD

## 2023-02-27 NOTE — PROGRESS NOTES
Occupational Therapy  Facility/Department: [facility]  Occupational Therapy Initial Assessment    Name: Evelyn Szymanski  : 1945  MRN: [id number]  Date of Service: 2023    Discharge Recommendations:  Patient would benefit from continued therapy after discharge       RN reports patient is medically stable for therapy treatment this date. Chart reviewed prior to treatment and patient is agreeable for therapy. All lines intact and patient positioned comfortably at end of treatment. All patient needs addressed prior to ending therapy session. Patient Diagnosis(es): The primary encounter diagnosis was Angioedema, initial encounter. A diagnosis of Swelling of upper lip was also pertinent to this visit. Past Medical History:  has a past medical history of Arthritis, BPH (benign prostatic hyperplasia), Cystic kidney disease, DDD (degenerative disc disease), lumbar, Hypertension, Renal mass, and Spondylolisthesis of lumbar region. Past Surgical History:  has a past surgical history that includes TURP (); Cystoscopy (); Colonoscopy; and laminectomy (N/A, 2/15/2023). Per H&P: Evelyn Szymanski is a 65 y.o. Non- / non  male who presents with Medication Reaction (Pt states he started having a swollen lip around noon and is on lisinopril. )   and is admitted to the hospital for the management of Angioedema due to angiotensin converting enzyme inhibitor (ACE-I). Patient has a hx of HTN and says he has been taking lisinopril for years. He has had a \"reaction\" to the lisinopril in 2016, but he says he took Benadryl and his reaction symptoms resolved, so he never told his PCP about it. Today he took his lisinopril around 1000, then between 2248-5513 he noticed that his whole upper lip was swollen. No swelling to his tongue or throat.  He has a PMH of tobacco use (quit 10 days ago), lumbar back surgery (approx 10 days ago with Dr Clari Calabrese), BPH, arthritis, cystic kidney disease, and renal mass, chronic tingling in his feet. While in the ED, he was given Pepcid PO, IV Decadron, and IV Benadryl. He was admitted to the ICU for further monitoring and management of angioedema. Assessment   Performance deficits / Impairments: Decreased functional mobility ; Decreased strength;Decreased endurance;Decreased ADL status; Decreased safe awareness;Decreased balance  Assessment: Skilled OT is indicated to increase overall IND and safety with self-care and functional tasks to return to PLOF  Prognosis: Good  Decision Making: Medium Complexity  REQUIRES OT FOLLOW-UP: Yes  Activity Tolerance  Activity Tolerance: Patient Tolerated treatment well        Plan   Occupational Therapy Plan  Times Per Week: 4-5x per week, 1x per day as vickie  Current Treatment Recommendations: Strengthening, Balance training, Functional mobility training, Endurance training, Patient/Caregiver education & training, Safety education & training, Equipment evaluation, education, & procurement, Self-Care / ADL, Positioning     Restrictions  Restrictions/Precautions  Restrictions/Precautions: General Precautions, Up as Tolerated  Required Braces or Orthoses?: No  Position Activity Restriction  Other position/activity restrictions: up with assist    Subjective   General  Chart Reviewed: Yes  Patient assessed for rehabilitation services?: Yes  Family / Caregiver Present: No  Subjective  Subjective: pt is pleasant and cooperative for OT eval     Social/Functional History  Social/Functional History  Lives With: Spouse  Type of Home: House  Home Layout: One level  Home Access: Stairs to enter without rails  Entrance Stairs - Number of Steps: 2  Bathroom Shower/Tub: Tub/Shower unit  Bathroom Toilet: Standard  Bathroom Equipment: Grab bars in shower, Shower chair, Toilet raiser  Home Equipment: Ameena Freeland, rolling, Cane  Has the patient had two or more falls in the past year or any fall with injury in the past year?: No  Receives Help From: Family  ADL Assistance: Independent  Homemaking Assistance: Independent  Homemaking Responsibilities: Yes  Ambulation Assistance: Independent  Transfer Assistance: Independent  Active : Yes  Mode of Transportation: Car  Occupation: Retired  Type of Occupation: [employer]  Leisure & Hobbies: gym, housework, yardwork       Objective              Safety Devices  Type of Devices: Gait belt;Left in chair;Call light within reach; Patient at risk for falls;Nurse notified; Chair alarm in place    Bed Mobility Training  Bed Mobility Training: Yes  Overall Level of Assistance: Minimum assistance  Interventions: Verbal cues; Tactile cues (Mod cues for log roll tech, use of bed rails, and use of BUE to scoot fully to EOB)  Supine to Sit: Minimum assistance  Sit to Supine: Other (comment) (pt in bedside chair upon exit)  Scooting: Contact-guard assistance    Transfer Training  Transfer Training: Yes  Overall Level of Assistance: Contact-guard assistance  Interventions: Verbal cues; Tactile cues; Safety awareness training (Min cues for RW safety/mgmt, upright posture, proper hand placement on stable surface, controlled sit<>stand, all to inc safety/reduce fall risk)  Sit to Stand: Contact-guard assistance  Stand to Sit: Contact-guard assistance    Functional Mobility  Overall Level of Assistance: Contact-guard assistance;Minimum assistance (to/from bathroom)  Interventions: Verbal cues; Tactile cues; Safety awareness training (Mod cues for assist with lines, RW safety/mgmt, upright posture, pacing, and scanning)  Assistive Device: Gait belt;Walker, rolling       AROM: Within functional limits  PROM: Within functional limits  Strength: Generally decreased, functional (4/5)  Coordination: Within functional limits  Tone: Normal    ADL  Feeding: Setup  Feeding Skilled Clinical Factors: pt with no difficulty eating breakfast (clear liquids)  Grooming: Contact guard assistance  Grooming Skilled Clinical Factors: CGA to wash his face and complete oral care while standing at the sink. Pts HR inc to 125-130 while standing at the sink. Pt returned to seated position and HR dec to 110's. UE Bathing: Stand by assistance  LE Bathing: Contact guard assistance  UE Dressing: Minimal assistance  LE Dressing: Contact guard assistance  LE Dressing Skilled Clinical Factors: CGA to adjust B socks while sitting EOB  Toileting: Contact guard assistance  Toileting Skilled Clinical Factors: Pt stood at the toilet to urinate with CGA for clothing mgmt                Vision  Vision: Impaired  Vision Exceptions: Wears glasses at all times  Hearing  Hearing: Within functional limits    Cognition  Overall Cognitive Status: Exceptions  Arousal/Alertness: Appropriate responses to stimuli  Following Commands: Follows all commands without difficulty  Attention Span: Appears intact  Memory: Appears intact  Safety Judgement: Decreased awareness of need for assistance;Decreased awareness of need for safety  Problem Solving: Assistance required to correct errors made;Assistance required to generate solutions  Insights: Decreased awareness of deficits  Initiation: Does not require cues  Sequencing: Requires cues for some  Orientation  Overall Orientation Status: Within Functional Limits                    Education Given To: Patient  Education Provided: Role of Therapy; ADL Adaptive Strategies; Fall Prevention Strategies;Transfer Training;Plan of Care;Energy Conservation  Education Provided Comments: OT POC, purpose of OT in acute care, safety in function, call light/fall prevention, benefits of OOB activity  Education Method: Verbal;Demonstration  Education Outcome: Continued education needed                            AM-PAC Score        AM-PAC Inpatient Daily Activity Raw Score: 19 (02/27/23 1157)  AM-PAC Inpatient ADL T-Scale Score : 40.22 (02/27/23 1157)  ADL Inpatient CMS 0-100% Score: 42.8 (02/27/23 1157)  ADL Inpatient CMS G-Code Modifier : CK (02/27/23 1157) Goals  Short Term Goals  Time Frame for Short Term Goals: by discharge, pt to demo  Short Term Goal 1: I/MI for bed mob tech without bed rails/controls  Short Term Goal 2: I/MI for total body ADLs with AE as needed and Good safety  Short Term Goal 3: I/MI for ADL transfers and functional mob with AD as needed and Good safety  Short Term Goal 4: pt/family to be IND with EC/WS, fall prevention tech, pressure relief education, discharge recommendations with use of handouts as needed  Patient Goals   Patient goals : to go home today       Therapy Time   Individual Concurrent Group Co-treatment   Time In 0827         Time Out 0855 (+10 chart review)         Minutes 38          Tx time: 28 min    Upon writer exit, call light within reach, pt retired to chair. All lines intact and patient positioned comfortably. All patient needs addressed prior to ending therapy session. Chart reviewed prior to treatment and patient is agreeable for therapy. RN reports patient is medically stable for therapy treatment this date.     Lori Sabillon OTR/L

## 2023-02-27 NOTE — PROGRESS NOTES
[facility] NOTE    Room # 0563/3077-39   Name: Beatrice Pacheco              Reason for visit: Routine. I visited the patient and family. Admit Date & Time: 2/26/2023  8:51 PM    Assessment:  Beatrice Pacheco is a 65 y.o. male who reports being in the hospital because \"allergic reaction to Lisinopril. \" Upon entering the room patient was sitting up in bed, awake and alert, with his son sitting bedside. Intervention:  I introduced myself and my title as  I offered space for patient and family to express feelings, needs, and concerns and provided a ministry presence. Patient and son engaged in conversation. Gave patient spiritual care business card and offered words of encouragement. Outcome:  Patient expressed gratitude for visit. Plan:  Chaplains will remain available to offer spiritual and emotional support as needed. Electronically signed by Wilfrido Cantrell on 2/27/2023 at [address]       02/27/23 1112   Encounter Summary   Encounter Overview/Reason  Initial Encounter   Service Provided For: Patient and family together   Referral/Consult From: [facility] Family members   Last Encounter  02/27/23   Complexity of Encounter Low   Begin Time 1015   End Time  1020   Total Time Calculated 5 min   Encounter    Type Initial Screen/Assessment   Assessment/Intervention/Outcome   Assessment Calm; Hopeful   Intervention Active listening;Discussed belief system/Oriental orthodox practices/love; Explored/Affirmed feelings, thoughts, concerns;Explored Coping Skills/Resources;Nurtured Hope   Outcome Comfort;Engaged in conversation;Expressed Gratitude   Plan and Referrals   Plan/Referrals No future visits requested

## 2023-02-27 NOTE — PLAN OF CARE
Problem: Discharge Planning  Goal: Discharge to home or other facility with appropriate resources  2/27/2023 0021 by Deidra Aggarwal RN  Outcome: Progressing  2/27/2023 0020 by Deidra Aggarwal RN  Outcome: Progressing  Flowsheets (Taken 2/26/2023 2303 by Lakeshia Leal, RN)  Discharge to home or other facility with appropriate resources:   Identify barriers to discharge with patient and caregiver   Arrange for needed discharge resources and transportation as appropriate   Identify discharge learning needs (meds, wound care, etc)     Problem: Safety - Adult  Goal: Free from fall injury  2/27/2023 0021 by Deidra Aggarwal RN  Outcome: Progressing  2/27/2023 0020 by Deidra Aggarwal RN  Outcome: Progressing  Flowsheets (Taken 2/26/2023 2254 by Lakeshia Leal RN)  Free From Fall Injury: Instruct family/caregiver on patient safety     Problem: ABCDS Injury Assessment  Goal: Absence of physical injury  2/27/2023 0021 by Deidra Aggarwal RN  Outcome: Progressing  2/27/2023 0020 by Deidra Aggarwal RN  Outcome: Progressing  [address] (Taken 2/26/2023 2254 by Lakeshia Leal RN)  Absence of Physical Injury: Implement safety measures based on patient assessment     Problem: Pain  Goal: Verbalizes/displays adequate comfort level or baseline comfort level  2/27/2023 0021 by Deidra Aggarwal RN  Outcome: Progressing  2/27/2023 0020 by Deidra Aggarwal RN  Outcome: Progressing  Flowsheets (Taken 2/26/2023 2255)  Verbalizes/displays adequate comfort level or baseline comfort level:   Encourage patient to monitor pain and request assistance   Administer analgesics based on type and severity of pain and evaluate response   Assess pain using appropriate pain scale   Implement non-pharmacological measures as appropriate and evaluate response   Notify Licensed Independent Practitioner if interventions unsuccessful or patient reports new pain     Problem: Respiratory - Adult  Goal: Achieves optimal ventilation and oxygenation  Outcome: Progressing     Problem: Skin/Tissue Integrity - Adult  Goal: Skin integrity remains intact  Outcome: Progressing

## 2023-02-27 NOTE — PROGRESS NOTES
AVS & education reviewed with pt and son at bedside, all questions answered. Pharmacy called to have prescriptions transferred. PIV removed, all belongings gathered and taken with pt.

## 2023-08-14 LAB — PROSTATE SPECIFIC ANTIGEN: 1.64 NG/ML

## 2023-09-09 ENCOUNTER — HOSPITAL ENCOUNTER (INPATIENT)
Age: 78
LOS: 1 days | Discharge: HOME | DRG: 313 | End: 2023-09-11
Attending: STUDENT IN AN ORGANIZED HEALTH CARE EDUCATION/TRAINING PROGRAM | Admitting: FAMILY MEDICINE
Payer: MEDICARE

## 2023-09-09 ENCOUNTER — APPOINTMENT (OUTPATIENT)
Dept: GENERAL RADIOLOGY | Age: 78
DRG: 313 | End: 2023-09-09
Payer: MEDICARE

## 2023-09-09 DIAGNOSIS — R07.9 CHEST PAIN, UNSPECIFIED TYPE: Primary | ICD-10-CM

## 2023-09-09 DIAGNOSIS — R79.89 ELEVATED TROPONIN: ICD-10-CM

## 2023-09-09 DIAGNOSIS — R07.89 OTHER CHEST PAIN: ICD-10-CM

## 2023-09-09 LAB
ANION GAP SERPL CALCULATED.3IONS-SCNC: 11 MMOL/L (ref 9–17)
BASOPHILS # BLD: 0.04 K/UL (ref 0–0.2)
BASOPHILS NFR BLD: 1 % (ref 0–2)
BNP SERPL-MCNC: 79 PG/ML
BUN SERPL-MCNC: 11 MG/DL (ref 8–23)
BUN/CREAT SERPL: 12 (ref 9–20)
CALCIUM SERPL-MCNC: 9.6 MG/DL (ref 8.6–10.4)
CHLORIDE SERPL-SCNC: 102 MMOL/L (ref 98–107)
CO2 SERPL-SCNC: 26 MMOL/L (ref 20–31)
CREAT SERPL-MCNC: 0.9 MG/DL (ref 0.7–1.2)
D DIMER PPP FEU-MCNC: 0.55 UG/ML FEU (ref 0–0.59)
EOSINOPHIL # BLD: 0.1 K/UL (ref 0–0.44)
EOSINOPHILS RELATIVE PERCENT: 2 % (ref 1–4)
ERYTHROCYTE [DISTWIDTH] IN BLOOD BY AUTOMATED COUNT: 13.7 % (ref 11.8–14.4)
GFR SERPL CREATININE-BSD FRML MDRD: >60 ML/MIN/1.73M2
GLUCOSE SERPL-MCNC: 93 MG/DL (ref 70–99)
HCT VFR BLD AUTO: 44.3 % (ref 40.7–50.3)
HGB BLD-MCNC: 14.8 G/DL (ref 13–17)
IMM GRANULOCYTES # BLD AUTO: 0.01 K/UL (ref 0–0.3)
IMM GRANULOCYTES NFR BLD: 0 %
LIPASE SERPL-CCNC: 17 U/L (ref 13–60)
LYMPHOCYTES NFR BLD: 1.96 K/UL (ref 1.1–3.7)
LYMPHOCYTES RELATIVE PERCENT: 29 % (ref 24–43)
MCH RBC QN AUTO: 30.3 PG (ref 25.2–33.5)
MCHC RBC AUTO-ENTMCNC: 33.4 G/DL (ref 28.4–34.8)
MCV RBC AUTO: 90.8 FL (ref 82.6–102.9)
MONOCYTES NFR BLD: 0.76 K/UL (ref 0.1–1.2)
MONOCYTES NFR BLD: 11 % (ref 3–12)
NEUTROPHILS NFR BLD: 57 % (ref 36–65)
NEUTS SEG NFR BLD: 3.84 K/UL (ref 1.5–8.1)
NRBC BLD-RTO: 0 PER 100 WBC
PLATELET # BLD AUTO: 217 K/UL (ref 138–453)
PMV BLD AUTO: 10.8 FL (ref 8.1–13.5)
POTASSIUM SERPL-SCNC: 4.3 MMOL/L (ref 3.7–5.3)
RBC # BLD AUTO: 4.88 M/UL (ref 4.21–5.77)
SODIUM SERPL-SCNC: 139 MMOL/L (ref 135–144)
TROPONIN I SERPL HS-MCNC: 42 NG/L (ref 0–22)
WBC OTHER # BLD: 6.7 K/UL (ref 3.5–11.3)

## 2023-09-09 PROCEDURE — 83880 ASSAY OF NATRIURETIC PEPTIDE: CPT

## 2023-09-09 PROCEDURE — 99285 EMERGENCY DEPT VISIT HI MDM: CPT

## 2023-09-09 PROCEDURE — 85025 COMPLETE CBC W/AUTO DIFF WBC: CPT

## 2023-09-09 PROCEDURE — 80048 BASIC METABOLIC PNL TOTAL CA: CPT

## 2023-09-09 PROCEDURE — 93005 ELECTROCARDIOGRAM TRACING: CPT

## 2023-09-09 PROCEDURE — 83690 ASSAY OF LIPASE: CPT

## 2023-09-09 PROCEDURE — 85379 FIBRIN DEGRADATION QUANT: CPT

## 2023-09-09 PROCEDURE — 71045 X-RAY EXAM CHEST 1 VIEW: CPT

## 2023-09-09 PROCEDURE — 84484 ASSAY OF TROPONIN QUANT: CPT

## 2023-09-09 RX ORDER — SODIUM CHLORIDE 0.9 % (FLUSH) 0.9 %
10 SYRINGE (ML) INJECTION PRN
Status: DISCONTINUED | OUTPATIENT
Start: 2023-09-09 | End: 2023-09-11 | Stop reason: HOSPADM

## 2023-09-09 RX ORDER — 0.9 % SODIUM CHLORIDE 0.9 %
80 INTRAVENOUS SOLUTION INTRAVENOUS ONCE
Status: COMPLETED | OUTPATIENT
Start: 2023-09-10 | End: 2023-09-10

## 2023-09-10 ENCOUNTER — APPOINTMENT (OUTPATIENT)
Dept: CT IMAGING | Age: 78
DRG: 313 | End: 2023-09-10
Payer: MEDICARE

## 2023-09-10 PROBLEM — R07.9 CHEST PAIN: Status: ACTIVE | Noted: 2023-09-10

## 2023-09-10 PROBLEM — R77.8 ELEVATED TROPONIN: Status: ACTIVE | Noted: 2023-09-10

## 2023-09-10 PROBLEM — E66.3 OVERWEIGHT (BMI 25.0-29.9): Status: ACTIVE | Noted: 2023-02-15

## 2023-09-10 PROBLEM — R79.89 ELEVATED TROPONIN: Status: ACTIVE | Noted: 2023-09-10

## 2023-09-10 PROBLEM — R07.89 OTHER CHEST PAIN: Status: ACTIVE | Noted: 2023-09-10

## 2023-09-10 LAB
ANION GAP SERPL CALCULATED.3IONS-SCNC: 11 MMOL/L (ref 9–17)
BILIRUB UR QL STRIP: NEGATIVE
BUN SERPL-MCNC: 10 MG/DL (ref 8–23)
BUN/CREAT SERPL: 13 (ref 9–20)
CALCIUM SERPL-MCNC: 9.4 MG/DL (ref 8.6–10.4)
CHLORIDE SERPL-SCNC: 102 MMOL/L (ref 98–107)
CLARITY UR: CLEAR
CO2 SERPL-SCNC: 24 MMOL/L (ref 20–31)
COLOR UR: YELLOW
CREAT SERPL-MCNC: 0.8 MG/DL (ref 0.7–1.2)
EPI CELLS #/AREA URNS HPF: NORMAL /HPF (ref 0–5)
GFR SERPL CREATININE-BSD FRML MDRD: >60 ML/MIN/1.73M2
GLUCOSE SERPL-MCNC: 97 MG/DL (ref 70–99)
GLUCOSE UR STRIP-MCNC: NEGATIVE MG/DL
HGB UR QL STRIP.AUTO: NEGATIVE
KETONES UR STRIP-MCNC: NEGATIVE MG/DL
LEUKOCYTE ESTERASE UR QL STRIP: NEGATIVE
NITRITE UR QL STRIP: NEGATIVE
PH UR STRIP: 7.5 [PH] (ref 5–8)
POTASSIUM SERPL-SCNC: 4.6 MMOL/L (ref 3.7–5.3)
PROT UR STRIP-MCNC: NEGATIVE MG/DL
RBC #/AREA URNS HPF: NORMAL /HPF (ref 0–2)
SODIUM SERPL-SCNC: 137 MMOL/L (ref 135–144)
SP GR UR STRIP: 1.02 (ref 1–1.03)
TROPONIN I SERPL HS-MCNC: 40 NG/L (ref 0–22)
TROPONIN I SERPL HS-MCNC: 46 NG/L (ref 0–22)
TROPONIN I SERPL HS-MCNC: 51 NG/L (ref 0–22)
UROBILINOGEN UR STRIP-ACNC: NORMAL EU/DL (ref 0–1)
WBC #/AREA URNS HPF: NORMAL /HPF (ref 0–5)

## 2023-09-10 PROCEDURE — 81001 URINALYSIS AUTO W/SCOPE: CPT

## 2023-09-10 PROCEDURE — 84484 ASSAY OF TROPONIN QUANT: CPT

## 2023-09-10 PROCEDURE — 6360000004 HC RX CONTRAST MEDICATION

## 2023-09-10 PROCEDURE — 2580000003 HC RX 258: Performed by: NURSE PRACTITIONER

## 2023-09-10 PROCEDURE — G0378 HOSPITAL OBSERVATION PER HR: HCPCS

## 2023-09-10 PROCEDURE — APPSS30 APP SPLIT SHARED TIME 16-30 MINUTES: Performed by: NURSE PRACTITIONER

## 2023-09-10 PROCEDURE — 6360000002 HC RX W HCPCS: Performed by: NURSE PRACTITIONER

## 2023-09-10 PROCEDURE — 1200000000 HC SEMI PRIVATE

## 2023-09-10 PROCEDURE — 36415 COLL VENOUS BLD VENIPUNCTURE: CPT

## 2023-09-10 PROCEDURE — 2000000000 HC ICU R&B

## 2023-09-10 PROCEDURE — 6370000000 HC RX 637 (ALT 250 FOR IP): Performed by: FAMILY MEDICINE

## 2023-09-10 PROCEDURE — 96374 THER/PROPH/DIAG INJ IV PUSH: CPT

## 2023-09-10 PROCEDURE — 96372 THER/PROPH/DIAG INJ SC/IM: CPT

## 2023-09-10 PROCEDURE — 6370000000 HC RX 637 (ALT 250 FOR IP): Performed by: NURSE PRACTITIONER

## 2023-09-10 PROCEDURE — 80048 BASIC METABOLIC PNL TOTAL CA: CPT

## 2023-09-10 PROCEDURE — 71260 CT THORAX DX C+: CPT

## 2023-09-10 PROCEDURE — 99222 1ST HOSP IP/OBS MODERATE 55: CPT | Performed by: FAMILY MEDICINE

## 2023-09-10 PROCEDURE — 2580000003 HC RX 258

## 2023-09-10 RX ORDER — ONDANSETRON 2 MG/ML
4 INJECTION INTRAMUSCULAR; INTRAVENOUS EVERY 6 HOURS PRN
Status: DISCONTINUED | OUTPATIENT
Start: 2023-09-10 | End: 2023-09-11 | Stop reason: HOSPADM

## 2023-09-10 RX ORDER — SODIUM CHLORIDE 9 MG/ML
INJECTION, SOLUTION INTRAVENOUS PRN
Status: DISCONTINUED | OUTPATIENT
Start: 2023-09-10 | End: 2023-09-11 | Stop reason: HOSPADM

## 2023-09-10 RX ORDER — METOPROLOL SUCCINATE 25 MG/1
25 TABLET, EXTENDED RELEASE ORAL DAILY
Status: DISCONTINUED | OUTPATIENT
Start: 2023-09-10 | End: 2023-09-11 | Stop reason: HOSPADM

## 2023-09-10 RX ORDER — ENOXAPARIN SODIUM 100 MG/ML
40 INJECTION SUBCUTANEOUS DAILY
Status: CANCELLED | OUTPATIENT
Start: 2023-09-10

## 2023-09-10 RX ORDER — POTASSIUM CHLORIDE 7.45 MG/ML
10 INJECTION INTRAVENOUS PRN
Status: DISCONTINUED | OUTPATIENT
Start: 2023-09-10 | End: 2023-09-11 | Stop reason: HOSPADM

## 2023-09-10 RX ORDER — ACETAMINOPHEN 325 MG/1
650 TABLET ORAL EVERY 6 HOURS PRN
Status: DISCONTINUED | OUTPATIENT
Start: 2023-09-10 | End: 2023-09-11 | Stop reason: HOSPADM

## 2023-09-10 RX ORDER — SODIUM CHLORIDE 0.9 % (FLUSH) 0.9 %
5-40 SYRINGE (ML) INJECTION EVERY 12 HOURS SCHEDULED
Status: DISCONTINUED | OUTPATIENT
Start: 2023-09-10 | End: 2023-09-11 | Stop reason: HOSPADM

## 2023-09-10 RX ORDER — MAGNESIUM HYDROXIDE/ALUMINUM HYDROXICE/SIMETHICONE 120; 1200; 1200 MG/30ML; MG/30ML; MG/30ML
30 SUSPENSION ORAL EVERY 6 HOURS PRN
Status: DISCONTINUED | OUTPATIENT
Start: 2023-09-10 | End: 2023-09-11 | Stop reason: HOSPADM

## 2023-09-10 RX ORDER — MAGNESIUM SULFATE 1 G/100ML
1000 INJECTION INTRAVENOUS PRN
Status: DISCONTINUED | OUTPATIENT
Start: 2023-09-10 | End: 2023-09-11 | Stop reason: HOSPADM

## 2023-09-10 RX ORDER — POLYETHYLENE GLYCOL 3350 17 G/17G
17 POWDER, FOR SOLUTION ORAL DAILY PRN
Status: DISCONTINUED | OUTPATIENT
Start: 2023-09-10 | End: 2023-09-11 | Stop reason: HOSPADM

## 2023-09-10 RX ORDER — ACETAMINOPHEN 650 MG/1
650 SUPPOSITORY RECTAL EVERY 6 HOURS PRN
Status: DISCONTINUED | OUTPATIENT
Start: 2023-09-10 | End: 2023-09-11 | Stop reason: HOSPADM

## 2023-09-10 RX ORDER — POTASSIUM CHLORIDE 20 MEQ/1
40 TABLET, EXTENDED RELEASE ORAL PRN
Status: DISCONTINUED | OUTPATIENT
Start: 2023-09-10 | End: 2023-09-11 | Stop reason: HOSPADM

## 2023-09-10 RX ORDER — ONDANSETRON 4 MG/1
4 TABLET, ORALLY DISINTEGRATING ORAL EVERY 8 HOURS PRN
Status: DISCONTINUED | OUTPATIENT
Start: 2023-09-10 | End: 2023-09-11 | Stop reason: HOSPADM

## 2023-09-10 RX ORDER — SODIUM CHLORIDE 0.9 % (FLUSH) 0.9 %
10 SYRINGE (ML) INJECTION PRN
Status: DISCONTINUED | OUTPATIENT
Start: 2023-09-10 | End: 2023-09-11 | Stop reason: HOSPADM

## 2023-09-10 RX ORDER — AMLODIPINE BESYLATE 5 MG/1
5 TABLET ORAL DAILY
Status: DISCONTINUED | OUTPATIENT
Start: 2023-09-10 | End: 2023-09-11 | Stop reason: HOSPADM

## 2023-09-10 RX ORDER — ENOXAPARIN SODIUM 100 MG/ML
100 INJECTION SUBCUTANEOUS 2 TIMES DAILY
Status: DISCONTINUED | OUTPATIENT
Start: 2023-09-10 | End: 2023-09-11 | Stop reason: HOSPADM

## 2023-09-10 RX ORDER — HYDRALAZINE HYDROCHLORIDE 20 MG/ML
10 INJECTION INTRAMUSCULAR; INTRAVENOUS EVERY 6 HOURS PRN
Status: DISCONTINUED | OUTPATIENT
Start: 2023-09-10 | End: 2023-09-11 | Stop reason: HOSPADM

## 2023-09-10 RX ADMIN — ENOXAPARIN SODIUM 100 MG: 100 INJECTION SUBCUTANEOUS at 10:49

## 2023-09-10 RX ADMIN — IOPAMIDOL 75 ML: 755 INJECTION, SOLUTION INTRAVENOUS at 00:18

## 2023-09-10 RX ADMIN — AMLODIPINE BESYLATE 5 MG: 5 TABLET ORAL at 17:16

## 2023-09-10 RX ADMIN — SODIUM CHLORIDE 80 ML: 9 INJECTION, SOLUTION INTRAVENOUS at 00:18

## 2023-09-10 RX ADMIN — SODIUM CHLORIDE, PRESERVATIVE FREE 10 ML: 5 INJECTION INTRAVENOUS at 12:06

## 2023-09-10 RX ADMIN — SODIUM CHLORIDE, PRESERVATIVE FREE 10 ML: 5 INJECTION INTRAVENOUS at 21:13

## 2023-09-10 RX ADMIN — SODIUM CHLORIDE, PRESERVATIVE FREE 10 ML: 5 INJECTION INTRAVENOUS at 00:19

## 2023-09-10 RX ADMIN — ENOXAPARIN SODIUM 100 MG: 100 INJECTION SUBCUTANEOUS at 21:12

## 2023-09-10 RX ADMIN — METOPROLOL SUCCINATE 25 MG: 25 TABLET, EXTENDED RELEASE ORAL at 10:44

## 2023-09-10 RX ADMIN — ONDANSETRON 4 MG: 2 INJECTION INTRAMUSCULAR; INTRAVENOUS at 17:16

## 2023-09-10 ASSESSMENT — ENCOUNTER SYMPTOMS
NAUSEA: 1
CONSTIPATION: 0
SHORTNESS OF BREATH: 1
COUGH: 0
VOMITING: 0
CHEST TIGHTNESS: 0
BACK PAIN: 0
DIARRHEA: 0
WHEEZING: 0
BLOOD IN STOOL: 0
SINUS PAIN: 0
NAUSEA: 0
ABDOMINAL PAIN: 0
CHOKING: 0
SORE THROAT: 0
SHORTNESS OF BREATH: 0
VOICE CHANGE: 0
SINUS PRESSURE: 0
ABDOMINAL DISTENTION: 1
RESPIRATORY NEGATIVE: 1
RHINORRHEA: 0

## 2023-09-10 NOTE — PROGRESS NOTES
Patient came to ED with complaint of bloating that resolves with antacid, belching, Verners but not overall improving. Once at ED, Pt complained of midsternal CP with SOB starting yesterday morning, noticed mild BLE swelling. Cardiac work up and troponins are 42 -> 40. Plan is to obtain Echo as observation status. Patient has been getting around by [facility], has cane, but not currently using it.     CT chest negative for PE  CXR negative

## 2023-09-10 NOTE — ED NOTES
Pt returned from [facility], [address] [place] [address]  09/10/23 6567

## 2023-09-10 NOTE — H&P
Genaro Grimes    HISTORY AND PHYSICAL EXAMINATION            Date:   9/10/2023  Patient name:  Yinka Simmons  Date of admission:  9/9/2023  9:33 PM  MRN:   [id number]  Account:  [de-identified]  YOB: 1945  PCP:    Meli Cohen MD  Room:   [place] [address]  Code Status:    Prior    Chief Complaint:     Chief Complaint   Patient presents with    [facility]     Ongoing for past couple months; reports some reflux and belching; typically improves with antacid and verners       History Obtained From:     patient    History of Present Illness:     Yinka Simmons is a 65 y.o. Non- / non  male who presents with Bloated (Ongoing for past couple months; reports some reflux and belching; typically improves with antacid and verners)   and is admitted to the hospital for the management of Elevated troponin. Patient says he has had tightness/ pressure in the midsternal in his chest for a couple of days. He also has dizziness, light headedness, slight nausea, blurry vision, and sore calves. He says he had a laminectomy in February 2023 and since then he has had tenderness in his calves and neuropathy in his hands and feet. He quit smoking in Feb and has a hx of HTN. While in ED, 12 lead EKG showed normal sinus rhythm, nonspecific ST abnormality. Trop was 41. Patient also noted to be hypertensive. He was admitted for further management of elevated troponin and chest pain.      Past Medical History:     Past Medical History:   Diagnosis Date    Arthritis     BPH (benign prostatic hyperplasia)     Cystic kidney disease     DDD (degenerative disc disease), lumbar     Hypertension     Renal mass     Spondylolisthesis of lumbar region         Past Surgical History:     Past Surgical History:   Procedure Laterality Date    COLONOSCOPY      CYSTOSCOPY  2012    LAMINECTOMY N/A 2/15/2023    L 3-5 LUMBAR LAMINECTOMY POSTERIOR  - GABRIELA performed by Gena Win CHEST PORTABLE    Result Date: 9/9/2023  No definable acute cardiopulmonary abnormality. No evidence of CHF. Assessment :      Hospital Problems             Last Modified POA    * (Principal) Elevated troponin 9/10/2023 Yes    Other chest pain 9/10/2023 Yes    Primary hypertension 9/10/2023 Yes    Overweight (BMI 25.0-29.9) 9/10/2023 Yes       Plan:     Patient status inpatient in the  Med/Surge    Elevated troponin/ other chest pain: Telemetry. Consult cardiology. Trend trop. Metoprolol as ordered. HTN: Lopressor BID. Parameters in place. Monitor VS as ordered. Overweight: Lifestyle modifications for weight loss. Consultations:   IP CONSULT TO INTERNAL MEDICINE  IP CONSULT TO CARDIOLOGY     Patient is admitted as inpatient status because of co-morbidities listed above, severity of signs and symptoms as outlined, requirement for current medical therapies and most importantly because of direct risk to patient if care not provided in a hospital setting. Expected length of stay > 48 hours.     SAMEERA Roberts NP  9/10/2023  7:32 AM    Copy sent to Dr. Antonio Krabbe, MD

## 2023-09-10 NOTE — ED NOTES
Pt daughter in law Usman Gomez [phone] called for update, RN updated her on plan of care and answered all questions.      Marine Mayorga RN  09/10/23 9106

## 2023-09-10 NOTE — H&P
[facility]  Office: [address], DO, Kenny Donovan, DO, Jhony Winchester, DO, Paris Tang, DO, Salma Dorado MD, Valente Favre, MD, Shon Patel MD, Barbara Nunn MD,  Jose Vargas MD, Natasha Crane MD, Saúl Cross DO, Karen Marin MD,  Chanell Levi MD, Jenny Leigh MD, Eliana Slater DO, Brandan Galindo MD,  Louie Dunbar DO, Werner Dudley MD, Gonzales Otto MD, Bard Max MD, Lynnette Patel MD,  Israel Hagan MD, Sylwia Kahn MD, Brendon Queen MD, Chaitanya Abdul MD, Justyn Sage DO, Noah Holden MD,  Shira Meadows MD, Shraddha Corbin MD, Claudette Means, CNP,  Romana Langford, CNP,, Joycelyn Lim, CNP,  Katherine Hampton, [facility], Shannon Galloway, CNP, Tena Alpers, CNP, Rita Bradley, CNP, Aleah Avila, CNP, Eduardo Hair, CNP, Sasha Burkett, CNP, Farida Sargent, CNS, Hernandez Steve, [facility], Lucas Rivero, [address]      HISTORY AND PHYSICAL EXAMINATION            Date:   9/10/2023  Patient name:  Juvencio Schmidt  Date of admission:  9/9/2023  9:33 PM  MRN:   [id number]  Account:  [de-identified]  YOB: 1945  PCP:    Brad Lehman MD  Room:   2035/2035-01  Code Status:    Full Code    Chief Complaint:     Chief Complaint   Patient presents with    [facility]     Ongoing for past couple months; reports some reflux and belching; typically improves with antacid and verners       History Obtained From:     patient, wife , daughter , son , electronic medical record    History of Present Illness: The patient is a 65 y.o. Non- / non  male who presents with Bloated (Ongoing for past couple months; reports some reflux and belching; typically improves with antacid and verners)   and he is admitted to the hospital for the management of  Elevated troponin.   Patient presented to emergency room in private vehicle after he had substernal chest pain and pressure while watching [employer]  final.  Patient tried Kylee-[place] and helped a little however continued to have discomfort. Patient was brought to ED by his wife. Patient denies any diaphoresis, palpitation, nausea, vomiting. Evaluation in emergency room showed elevated blood pressure, EKG showed nonspecific specific ST abnormality. Troponins were mildly elevated 40 2 repeat 51. Kidney function was normal.  CT chest was negative for PE however showed mildly dilated loops of small bowel with small to moderate amount of gas. Patient was alert, oriented. Patient has underlying history of hypertension, obesity, chronic back pain with L3-L4 laminectomy  surgery. Wife reports loud snoring during sleep without apneic events. Past Medical History:     Past Medical History:   Diagnosis Date    Arthritis     BPH (benign prostatic hyperplasia)     Cystic kidney disease     DDD (degenerative disc disease), lumbar     Hypertension     Renal mass     Spondylolisthesis of lumbar region         Past Surgical History:     Past Surgical History:   Procedure Laterality Date    COLONOSCOPY      CYSTOSCOPY  2012    LAMINECTOMY N/A 2/15/2023    L 3-5 LUMBAR LAMINECTOMY POSTERIOR  - GABRIELA performed by Janessa Leonard MD at [facility]  2006        Medications Prior to Admission:     Prior to Admission medications    Medication Sig Start Date End Date Taking? Authorizing Provider   metoprolol succinate (TOPROL XL) 25 MG extended release tablet Take 1 tablet by mouth daily 2/27/23   Barrie FELIX Blood, DO   tiZANidine (ZANAFLEX) 4 MG tablet Take 1 tablet by mouth every 8 hours as needed (spasm) 2/16/23   Janessa Leonard MD        Allergies:     Lisinopril    Social History:     Tobacco:    reports that he quit smoking about 6 months ago. His smoking use included cigarettes. He started smoking about 63 years ago. He smoked an average of .5 packs per day. He has never used smokeless tobacco.  Alcohol:      reports current alcohol use.   Drug Use:  reports no

## 2023-09-10 NOTE — PROGRESS NOTES
4 Eyes Skin Assessment     NAME:  Asiya Pérez  YOB: 1945  MEDICAL RECORD NUMBER:  [id number]    The patient is being assessed for  Admission    I agree that at least one RN has performed a thorough Head to Toe Skin Assessment on the patient. ALL assessment sites listed below have been assessed. Areas assessed by both nurses:    Head, Face, Ears, Shoulders, Back, Chest, Arms, Elbows, Hands, Sacrum. Buttock, Coccyx, Ischium, and Legs. Feet and Heels        Does the Patient have a Wound?  No noted wound(s)       D[place]ayne Prevention initiated by RN: No  Wound Care Orders initiated by RN: No    Pressure Injury (Stage 3,4, Unstageable, DTI, NWPT, and Complex wounds) if present, place Wound referral order by RN under : No    New Ostomies, if present place, Ostomy referral order under : No     Nurse 1 eSignature: Electronically signed by Leroy Barraza RN on 9/10/23 at 3:40 PM EDT    **SHARE this note so that the co-signing nurse can place an eSignature**    Nurse 2 eSignature: Electronically signed by Leroy Barraza RN on 9/10/23 at 3:40 PM EDT

## 2023-09-10 NOTE — ED PROVIDER NOTES
[address] [facility] ED  eMERGENCY dEPARTMENT eNCOUnter      Pt Name: Rodolfo Perry  MRN: [id number]  [facility] 1945  Date of evaluation: 9/9/2023  Provider: SAMEERA Fernandez - [facility]       Chief Complaint   Patient presents with    Bloated     Ongoing for past couple months; reports some reflux and belching; typically improves with antacid and verners         HISTORY OF PRESENT ILLNESS  (Location/Symptom, Timing/Onset, Context/Setting, Quality, Duration, Modifying Factors, Severity.)   Rodolfo Perry is a 65 y.o. male who presents to the emergency department with complaint of midsternal chest pain and pressure that began late morning and was accompanied by shortness of breath. Patient states that the pain was relieved with rest, no recent illnesses or coughs, no sick contacts at home. No recent injury, trauma or heavy lifting. No significant cardiac history, patient on monotherapy for hypertension, metoprolol. Patient states he was taken off of lisinopril due to angioedema. Patient denies any headache or vision changes. Patient reports that he feels his abdomen is more bloated than normal, took some Kylee-[place] with minimal relief. Denies any abdominal pain, nausea or vomiting, fever or chills. Patient does report that he has noticed some swelling to his lower extremities. He states that his chest pain has mostly resolved at this time. Patient is alert and oriented x4, does not appear to be in any acute distress at this time. Nursing Notes were reviewed.     ALLERGIES     Lisinopril    CURRENT MEDICATIONS       Previous Medications    METOPROLOL SUCCINATE (TOPROL XL) 25 MG EXTENDED RELEASE TABLET    Take 1 tablet by mouth daily    TIZANIDINE (ZANAFLEX) 4 MG TABLET    Take 1 tablet by mouth every 8 hours as needed (spasm)       PAST MEDICAL HISTORY         Diagnosis Date    Arthritis     BPH (benign prostatic hyperplasia)     Cystic kidney disease     DDD

## 2023-09-11 ENCOUNTER — APPOINTMENT (OUTPATIENT)
Dept: NUCLEAR MEDICINE | Age: 78
DRG: 313 | End: 2023-09-11
Attending: FAMILY MEDICINE
Payer: MEDICARE

## 2023-09-11 ENCOUNTER — APPOINTMENT (OUTPATIENT)
Age: 78
DRG: 313 | End: 2023-09-11
Attending: FAMILY MEDICINE
Payer: MEDICARE

## 2023-09-11 VITALS
DIASTOLIC BLOOD PRESSURE: 73 MMHG | HEART RATE: 62 BPM | HEIGHT: 73 IN | RESPIRATION RATE: 18 BRPM | OXYGEN SATURATION: 92 % | TEMPERATURE: 97.7 F | WEIGHT: 230 LBS | SYSTOLIC BLOOD PRESSURE: 158 MMHG | BODY MASS INDEX: 30.48 KG/M2

## 2023-09-11 LAB
ANION GAP SERPL CALCULATED.3IONS-SCNC: 7 MMOL/L (ref 9–17)
BUN SERPL-MCNC: 11 MG/DL (ref 8–23)
BUN/CREAT SERPL: 12 (ref 9–20)
CALCIUM SERPL-MCNC: 9.4 MG/DL (ref 8.6–10.4)
CHLORIDE SERPL-SCNC: 105 MMOL/L (ref 98–107)
CO2 SERPL-SCNC: 28 MMOL/L (ref 20–31)
CREAT SERPL-MCNC: 0.9 MG/DL (ref 0.7–1.2)
ECHO BSA: 2.32 M2
GFR SERPL CREATININE-BSD FRML MDRD: >60 ML/MIN/1.73M2
GLUCOSE SERPL-MCNC: 97 MG/DL (ref 70–99)
MAGNESIUM SERPL-MCNC: 1.9 MG/DL (ref 1.6–2.6)
POTASSIUM SERPL-SCNC: 4.3 MMOL/L (ref 3.7–5.3)
SODIUM SERPL-SCNC: 140 MMOL/L (ref 135–144)
STRESS BASELINE DIAS BP: 73 MMHG
STRESS BASELINE HR: 59 BPM
STRESS BASELINE SYS BP: 158 MMHG
STRESS ESTIMATED WORKLOAD: 1 METS
STRESS PEAK DIAS BP: 73 MMHG
STRESS PEAK SYS BP: 158 MMHG
STRESS PERCENT HR ACHIEVED: 73 %
STRESS POST PEAK HR: 103 BPM
STRESS RATE PRESSURE PRODUCT: NORMAL BPM*MMHG
STRESS ST DEPRESSION: 0 MM
STRESS ST ELEVATION: 0 MM
STRESS TARGET HR: 142 BPM

## 2023-09-11 PROCEDURE — 78452 HT MUSCLE IMAGE SPECT MULT: CPT

## 2023-09-11 PROCEDURE — 6360000002 HC RX W HCPCS: Performed by: NURSE PRACTITIONER

## 2023-09-11 PROCEDURE — 6370000000 HC RX 637 (ALT 250 FOR IP): Performed by: NURSE PRACTITIONER

## 2023-09-11 PROCEDURE — 99238 HOSP IP/OBS DSCHRG MGMT 30/<: CPT | Performed by: FAMILY MEDICINE

## 2023-09-11 PROCEDURE — 96372 THER/PROPH/DIAG INJ SC/IM: CPT

## 2023-09-11 PROCEDURE — 36415 COLL VENOUS BLD VENIPUNCTURE: CPT

## 2023-09-11 PROCEDURE — 83735 ASSAY OF MAGNESIUM: CPT

## 2023-09-11 PROCEDURE — 93017 CV STRESS TEST TRACING ONLY: CPT

## 2023-09-11 PROCEDURE — A9500 TC99M SESTAMIBI: HCPCS | Performed by: FAMILY MEDICINE

## 2023-09-11 PROCEDURE — G0378 HOSPITAL OBSERVATION PER HR: HCPCS

## 2023-09-11 PROCEDURE — 3430000000 HC RX DIAGNOSTIC RADIOPHARMACEUTICAL: Performed by: FAMILY MEDICINE

## 2023-09-11 PROCEDURE — 6360000002 HC RX W HCPCS: Performed by: FAMILY MEDICINE

## 2023-09-11 PROCEDURE — 2580000003 HC RX 258: Performed by: FAMILY MEDICINE

## 2023-09-11 PROCEDURE — 80048 BASIC METABOLIC PNL TOTAL CA: CPT

## 2023-09-11 PROCEDURE — 2580000003 HC RX 258: Performed by: NURSE PRACTITIONER

## 2023-09-11 PROCEDURE — 6370000000 HC RX 637 (ALT 250 FOR IP): Performed by: FAMILY MEDICINE

## 2023-09-11 PROCEDURE — 94761 N-INVAS EAR/PLS OXIMETRY MLT: CPT

## 2023-09-11 RX ORDER — NITROGLYCERIN 0.4 MG/1
0.4 TABLET SUBLINGUAL EVERY 5 MIN PRN
Qty: 25 TABLET | Refills: 3 | Status: SHIPPED | OUTPATIENT
Start: 2023-09-11

## 2023-09-11 RX ORDER — SODIUM CHLORIDE 0.9 % (FLUSH) 0.9 %
5-40 SYRINGE (ML) INJECTION PRN
Status: ACTIVE | OUTPATIENT
Start: 2023-09-11 | End: 2023-09-11

## 2023-09-11 RX ORDER — AMLODIPINE BESYLATE 5 MG/1
5 TABLET ORAL DAILY
Qty: 30 TABLET | Refills: 3 | Status: SHIPPED | OUTPATIENT
Start: 2023-09-12

## 2023-09-11 RX ORDER — ASPIRIN 81 MG/1
81 TABLET ORAL DAILY
Qty: 90 TABLET | Refills: 1 | Status: SHIPPED | OUTPATIENT
Start: 2023-09-11 | End: 2023-09-11 | Stop reason: SDUPTHER

## 2023-09-11 RX ORDER — SODIUM CHLORIDE 9 MG/ML
500 INJECTION, SOLUTION INTRAVENOUS CONTINUOUS PRN
Status: ACTIVE | OUTPATIENT
Start: 2023-09-11 | End: 2023-09-11

## 2023-09-11 RX ORDER — NITROGLYCERIN 0.4 MG/1
0.4 TABLET SUBLINGUAL EVERY 5 MIN PRN
Status: ACTIVE | OUTPATIENT
Start: 2023-09-11 | End: 2023-09-11

## 2023-09-11 RX ORDER — ASPIRIN 81 MG/1
81 TABLET ORAL DAILY
Qty: 90 TABLET | Refills: 1 | Status: SHIPPED | OUTPATIENT
Start: 2023-09-11

## 2023-09-11 RX ORDER — SIMVASTATIN 20 MG
20 TABLET ORAL EVERY EVENING
Qty: 30 TABLET | Refills: 3 | Status: SHIPPED | OUTPATIENT
Start: 2023-09-11 | End: 2023-09-11 | Stop reason: SDUPTHER

## 2023-09-11 RX ORDER — AMLODIPINE BESYLATE 5 MG/1
5 TABLET ORAL DAILY
Qty: 30 TABLET | Refills: 3 | Status: SHIPPED | OUTPATIENT
Start: 2023-09-12 | End: 2023-09-11 | Stop reason: SDUPTHER

## 2023-09-11 RX ORDER — TETRAKIS(2-METHOXYISOBUTYLISOCYANIDE)COPPER(I) TETRAFLUOROBORATE 1 MG/ML
19.8 INJECTION, POWDER, LYOPHILIZED, FOR SOLUTION INTRAVENOUS
Status: COMPLETED | OUTPATIENT
Start: 2023-09-11 | End: 2023-09-11

## 2023-09-11 RX ORDER — REGADENOSON 0.08 MG/ML
0.4 INJECTION, SOLUTION INTRAVENOUS
Status: DISCONTINUED | OUTPATIENT
Start: 2023-09-11 | End: 2023-09-11 | Stop reason: HOSPADM

## 2023-09-11 RX ORDER — AMINOPHYLLINE DIHYDRATE 25 MG/ML
50 INJECTION, SOLUTION INTRAVENOUS PRN
Status: ACTIVE | OUTPATIENT
Start: 2023-09-11 | End: 2023-09-11

## 2023-09-11 RX ORDER — TETRAKIS(2-METHOXYISOBUTYLISOCYANIDE)COPPER(I) TETRAFLUOROBORATE 1 MG/ML
41.8 INJECTION, POWDER, LYOPHILIZED, FOR SOLUTION INTRAVENOUS
Status: COMPLETED | OUTPATIENT
Start: 2023-09-11 | End: 2023-09-11

## 2023-09-11 RX ORDER — NITROGLYCERIN 0.4 MG/1
0.4 TABLET SUBLINGUAL EVERY 5 MIN PRN
Qty: 25 TABLET | Refills: 3 | Status: SHIPPED | OUTPATIENT
Start: 2023-09-11 | End: 2023-09-11 | Stop reason: SDUPTHER

## 2023-09-11 RX ORDER — ATROPINE SULFATE 0.1 MG/ML
0.5 INJECTION INTRAVENOUS EVERY 5 MIN PRN
Status: ACTIVE | OUTPATIENT
Start: 2023-09-11 | End: 2023-09-11

## 2023-09-11 RX ORDER — REGADENOSON 0.08 MG/ML
0.4 INJECTION, SOLUTION INTRAVENOUS
Status: COMPLETED | OUTPATIENT
Start: 2023-09-11 | End: 2023-09-11

## 2023-09-11 RX ORDER — METOPROLOL TARTRATE 5 MG/5ML
5 INJECTION INTRAVENOUS EVERY 5 MIN PRN
Status: ACTIVE | OUTPATIENT
Start: 2023-09-11 | End: 2023-09-11

## 2023-09-11 RX ORDER — ALBUTEROL SULFATE 90 UG/1
2 AEROSOL, METERED RESPIRATORY (INHALATION) PRN
Status: ACTIVE | OUTPATIENT
Start: 2023-09-11 | End: 2023-09-11

## 2023-09-11 RX ORDER — SIMVASTATIN 20 MG
20 TABLET ORAL EVERY EVENING
Qty: 30 TABLET | Refills: 3 | Status: SHIPPED | OUTPATIENT
Start: 2023-09-11

## 2023-09-11 RX ADMIN — SODIUM CHLORIDE, PRESERVATIVE FREE 5 ML: 5 INJECTION INTRAVENOUS at 10:21

## 2023-09-11 RX ADMIN — SODIUM CHLORIDE, PRESERVATIVE FREE 10 ML: 5 INJECTION INTRAVENOUS at 10:20

## 2023-09-11 RX ADMIN — Medication 41.8 MILLICURIE: at 08:50

## 2023-09-11 RX ADMIN — Medication 19.8 MILLICURIE: at 07:25

## 2023-09-11 RX ADMIN — AMLODIPINE BESYLATE 5 MG: 5 TABLET ORAL at 10:20

## 2023-09-11 RX ADMIN — REGADENOSON 0.4 MG: 0.08 INJECTION, SOLUTION INTRAVENOUS at 08:43

## 2023-09-11 RX ADMIN — ENOXAPARIN SODIUM 100 MG: 100 INJECTION SUBCUTANEOUS at 10:20

## 2023-09-11 RX ADMIN — METOPROLOL SUCCINATE 25 MG: 25 TABLET, EXTENDED RELEASE ORAL at 10:20

## 2023-09-11 RX ADMIN — SODIUM CHLORIDE, PRESERVATIVE FREE 10 ML: 5 INJECTION INTRAVENOUS at 08:43

## 2023-09-11 ASSESSMENT — ENCOUNTER SYMPTOMS
ABDOMINAL PAIN: 0
WHEEZING: 0
CHOKING: 0
RHINORRHEA: 0
CHEST TIGHTNESS: 0
SINUS PRESSURE: 0
VOMITING: 0
VOICE CHANGE: 0
SHORTNESS OF BREATH: 0
DIARRHEA: 0
COUGH: 0
CONSTIPATION: 0
NAUSEA: 0
BACK PAIN: 0

## 2023-09-11 NOTE — ACP (ADVANCE CARE PLANNING)
Advance Care Planning     Advance Care Planning Activator (Inpatient)  Conversation Note      Date of ACP Conversation: 9/11/2023     Conversation Conducted with: Patient with Decision Making Capacity    ACP Activator: Kemar Sosa RN        Health Care Decision Maker:     Current Designated Health Care Decision Maker:     Primary Decision Maker: Gena Dick - Ex-Spouse - [phone]  Click here to complete Healthcare Decision Makers including section of the Healthcare Decision Maker Relationship (ie \"Primary\")  Today we documented Decision Maker(s) consistent with Legal Next of Kin hierarchy. Care Preferences    Ventilation: \"If you were in your present state of health and suddenly became very ill and were unable to breathe on your own, what would your preference be about the use of a ventilator (breathing machine) if it were available to you? \"      Would the patient desire the use of ventilator (breathing machine)?: yes    Confirmed pt is full code, he lives with ex wife Gena Dick they are . Has 4 kids-explained they are next of kin-he agrees. [] Yes   [] No   Educated Patient / Renu Serrato regarding differences between Advance Directives and portable DNR orders.     Length of ACP Conversation in minutes:  10    Conversation Outcomes:  ACP discussion completed    Follow-up plan:    [] Schedule follow-up conversation to continue planning  [] Referred individual to Provider for additional questions/concerns   [] Advised patient/agent/surrogate to review completed ACP document and update if needed with changes in condition, patient preferences or care setting    [] This note routed to one or more involved healthcare providers

## 2023-09-11 NOTE — DISCHARGE SUMMARY
[facility]  Office: 79[address]  1826, DO, Cydney Lopez, DO, Johan Sutton, DO, Mary Ann Tang, DO, Sindhu Castellano MD, Charissa Grove MD, Anthony Joyce MD, Andre Kraus MD,  Greg Vega MD, Gianfranco Tran MD, Maria Del Carmen Poole DO, Maximilian Degroot MD,  May Barlow MD, Maddie Cobos MD, Jean Zhao DO, Lianna Mccormack MD,  Sonya Vizcaino DO, Ulysses Sprinkle, MD, Rosangela Hamilton MD, Brittany Amaya MD, Savannah Basurto MD,  River Tipton MD, Griffin Iverson MD, Dhara Arambula MD, Binh Sewell MD, Jaida Crespo DO, Phill Skinner MD,  Caitlin Olmstead MD, Faraz Styles MD, Marley Dickson, CNP,  Michele Levin, CNP,, Debbie Roth, CNP,  Yadira Toth, DNP, Russel Shoulder, CNP, Clifford Rhodes, CNP, Simin Allegra, CNP, Vane Hill, CNP, Emmy Beard, CNP, Moni Mon, CNP, Martha Gamma, CNS, Neo Plate, CNP, Kaylin Foy      Discharge Summary     Patient ID: Tiffanie Merlos  :  1945   MRN: [id number]     ACCOUNT:  [de-identified]   Patient Location :   Patient's PCP: Trenton Guzman MD  Admit Date: 2023   Discharge Date:  23     Length of Stay: 1  Code Status:  Full Code  Admitting Physician: Anthony Joyce MD  Discharge Physician: Anthony Joyce MD     Active Discharge Diagnosis :     Primary Problem  Elevated troponin      Hospital Problems  Active Hospital Problems    Diagnosis Date Noted    Primary hypertension [I10] 02/15/2023     Priority: Medium    Overweight (BMI 25.0-29. 9) [E66.3] 02/15/2023     Priority: Medium    Elevated troponin [R77.8] 09/10/2023    Chest pain [R07.9] 09/10/2023       Admission Condition:  fair     Discharged Condition: stable    Hospital Stay:     Hospital Course:   Tiffanie Merlos is a 65 y.o. male who was admitted for the management of   Elevated troponin , presented to ER with Bloated (Ongoing for past couple months; reports some reflux [address]  [phone]    Follow up  hospital follow up, discuss Prediabetes. recommend fasting blood sugar and lipid panel outpatient. Requiring Further Evaluation/Follow Up POST HOSPITALIZATION/Incidental Findings: follow up with PCP to discuss prediabetes and Obesity and management,  Outpatient fasting blood sugar and lipid panel     Diet: cardiac diet and low fat, low cholesterol diet    Activity: As tolerated. Instructions to Patient: low Card diet . Limit Hard candies use. Discharge Medications:      Medication List        START taking these medications      amLODIPine 5 MG tablet  Commonly known as: NORVASC  Take 1 tablet by mouth daily  Start taking on: September 12, 2023     aspirin 81 MG EC tablet  Take 1 tablet by mouth daily     nitroGLYCERIN 0.4 MG SL tablet  Commonly known as: NITROSTAT  Place 1 tablet under the tongue every 5 minutes as needed for Chest pain up to max of 3 total doses. If no relief after 1 dose, call 911. simvastatin 20 MG tablet  Commonly known as: Zocor  Take 1 tablet by mouth every evening            CONTINUE taking these medications      metoprolol succinate 25 MG extended release tablet  Commonly known as: Toprol XL  Take 1 tablet by mouth daily            STOP taking these medications      tiZANidine 4 MG tablet  Commonly known as: Concordelida Burgess               Where to Get Your Medications        These medications were sent to [facility], [address] [place] [address]      Phone: [phone]   amLODIPine 5 MG tablet  aspirin 81 MG EC tablet  nitroGLYCERIN 0.4 MG SL tablet  simvastatin 20 MG tablet         Time Spent on discharge is  25 mins in patient examination, evaluation, counseling as well as medication reconciliation, prescriptions for required medications, discharge plan and follow up.     Electronically signed by   Nathan Brown MD  9/11/2023        Thank you Dr. Suyapa Hackett MD for

## 2023-09-11 NOTE — PLAN OF CARE
Problem: Discharge Planning  Goal: Discharge to home or other facility with appropriate resources  9/11/2023 0050 by Raymon Sanchez RN  Outcome: Progressing  9/10/2023 1636 by Nilsa Cristina RN  Outcome: Progressing     Problem: Safety - Adult  Goal: Free from fall injury  9/11/2023 0050 by Raymon Sanchez RN  Outcome: Progressing  9/10/2023 1636 by Nilsa Cristina RN  Outcome: Progressing

## 2023-09-11 NOTE — CARE COORDINATION
Case Management Assessment  Initial Evaluation    Date/Time of Evaluation: 9/11/2023 11:30 AM  Assessment Completed by: Anai Bravo RN    If patient is discharged prior to next notation, then this note serves as note for discharge by case management. Patient Name: Nellie Beavers                   YOB: 1945  Diagnosis: Elevated troponin [R77.8]  Chest pain, unspecified type [R07.9]                   Date / Time: 9/9/2023  9:33 PM    Patient Admission Status: Inpatient   Readmission Risk (Low < 19, Mod (19-27), High > 27): Readmission Risk Score: 5.6    Current PCP: Suyapa Hackett MD  PCP verified by CM? Yes    Chart Reviewed: Yes      History Provided by: Patient  Patient Orientation: Alert and Oriented    Patient Cognition: Alert    Hospitalization in the last 30 days (Readmission):  No    If yes, Readmission Assessment in CM Navigator will be completed. Advance Directives:      Code Status: Full Code   Patient's Primary Decision Maker is: Legal Next of Kin    Primary Decision MakerPrincess Glenroy - Ex-Spouse - [phone]    Discharge Planning:    Patient lives with: Spouse/Significant Other Type of Home: House  Primary Care Giver: Self  Patient Support Systems include: Spouse/Significant Other, Family Members, Children   Current Financial resources: Medicare  Current community resources:    Current services prior to admission: Durable Medical Equipment            Current DME: [employer], Sh[employer]            Type of Home Care services:  None    ADLS  Prior functional level: Independent in ADLs/IADLs  Current functional level: Independent in ADLs/IADLs    PT AM-PAC:   /24  OT AM-PAC:   /24    Family can provide assistance at DC: Yes  Would you like Case Management to discuss the discharge plan with any other family members/significant others, and if so, who?  Yes (s.o, dtr present)  Plans to Return to Present Housing: Yes  Other Identified Issues/Barriers to RETURNING to current

## 2023-09-12 LAB
EKG ATRIAL RATE: 64 BPM
EKG P AXIS: 42 DEGREES
EKG P-R INTERVAL: 156 MS
EKG Q-T INTERVAL: 370 MS
EKG QRS DURATION: 82 MS
EKG QTC CALCULATION (BAZETT): 381 MS
EKG R AXIS: -26 DEGREES
EKG T AXIS: -18 DEGREES
EKG VENTRICULAR RATE: 64 BPM

## 2023-09-12 PROCEDURE — 93010 ELECTROCARDIOGRAM REPORT: CPT | Performed by: INTERNAL MEDICINE

## 2023-10-10 PROBLEM — R79.89 ELEVATED TROPONIN: Status: RESOLVED | Noted: 2023-09-10 | Resolved: 2023-10-10

## 2024-09-16 LAB — PROSTATE SPECIFIC ANTIGEN: 1.68 NG/ML

## (undated) DEVICE — GLOVE SURG SZ 85 CRM LTX FREE POLYISOPRENE POLYMER BEAD ANTI

## (undated) DEVICE — INSERT CUSHION HEAD PRONEVIEW

## (undated) DEVICE — C-ARM: Brand: UNBRANDED

## (undated) DEVICE — GLOVE SURG SZ 85 L12IN FNGR THK79MIL GRN LTX FREE

## (undated) DEVICE — 1010 S-DRAPE TOWEL DRAPE 10/BX: Brand: STERI-DRAPE™

## (undated) DEVICE — BLANKET WRM W29.9XL79.1IN UP BODY FORC AIR MISTRAL-AIR

## (undated) DEVICE — Device

## (undated) DEVICE — GAUZE, BORDER, 3"X6", 1.5"X4"PAD, STERIL: Brand: MEDLINE INDUSTRIES, INC.

## (undated) DEVICE — SUTURE VCRL SZ 0 L36IN ABSRB UD L36MM CT-1 1/2 CIR J946H

## (undated) DEVICE — CORD,CAUTERY,BIPOLAR,STERILE: Brand: MEDLINE

## (undated) DEVICE — SPONGE,NEURO,1"X1",XR,STRL,LF,10/PK: Brand: MEDLINE

## (undated) DEVICE — SUTURE VCRL SZ 2-0 L36IN ABSRB UD L36MM CT-1 1/2 CIR J945H

## (undated) DEVICE — SUTURE MCRYL SZ 4-0 L27IN ABSRB UD L19MM PS-2 1/2 CIR PRIM Y426H

## (undated) DEVICE — TUBING, SUCTION, 1/4" X 12', STRAIGHT: Brand: MEDLINE

## (undated) DEVICE — GARMENT,MEDLINE,DVT,INT,CALF,MED, GEN2: Brand: MEDLINE

## (undated) DEVICE — AGENT HEMSTAT 8ML FLX TIP MTRX + DISP SURGIFLO

## (undated) DEVICE — 4.0MM PRECISION ROUND

## (undated) DEVICE — ADHESIVE SKIN CLOSURE TOP 36 CC HI VISC DERMBND MINI

## (undated) DEVICE — JCKSON TBL POSTNER NO HD REST: Brand: MEDLINE INDUSTRIES, INC.